# Patient Record
Sex: MALE | Race: WHITE | NOT HISPANIC OR LATINO | Employment: FULL TIME | URBAN - METROPOLITAN AREA
[De-identification: names, ages, dates, MRNs, and addresses within clinical notes are randomized per-mention and may not be internally consistent; named-entity substitution may affect disease eponyms.]

---

## 2022-06-03 ENCOUNTER — TRANSFERRED RECORDS (OUTPATIENT)
Dept: HEALTH INFORMATION MANAGEMENT | Facility: CLINIC | Age: 62
End: 2022-06-03

## 2022-06-14 ENCOUNTER — MEDICAL CORRESPONDENCE (OUTPATIENT)
Dept: HEALTH INFORMATION MANAGEMENT | Facility: CLINIC | Age: 62
End: 2022-06-14

## 2022-06-16 ENCOUNTER — TRANSFERRED RECORDS (OUTPATIENT)
Dept: HEALTH INFORMATION MANAGEMENT | Facility: CLINIC | Age: 62
End: 2022-06-16

## 2022-06-20 ENCOUNTER — MEDICAL CORRESPONDENCE (OUTPATIENT)
Dept: HEALTH INFORMATION MANAGEMENT | Facility: CLINIC | Age: 62
End: 2022-06-20

## 2022-06-30 ENCOUNTER — TRANSCRIBE ORDERS (OUTPATIENT)
Dept: OTHER | Age: 62
End: 2022-06-30

## 2022-06-30 DIAGNOSIS — S31.109A WOUND OF GROIN: Primary | ICD-10-CM

## 2022-07-01 ENCOUNTER — TELEPHONE (OUTPATIENT)
Dept: WOUND CARE | Facility: CLINIC | Age: 62
End: 2022-07-01

## 2022-07-01 DIAGNOSIS — I73.9 PAD (PERIPHERAL ARTERY DISEASE) (H): Primary | ICD-10-CM

## 2022-07-01 NOTE — TELEPHONE ENCOUNTER
Consult received via phone from Jenaro Pedroza for wound of the groin.    Patient has history of Diabetes. Per Standing Order patient qualifies for DEEDEE to be scheduled prior to assessment with Kristina Irwin PA-C or Dr. Nicole at [facility] [place] at [facility] for next available appointment.     Please inquire patient a KAEL lift?  Yes or No    Routing to [facility] Appointment Scheduling Pool.  Orders pending.

## 2022-07-05 ENCOUNTER — TELEPHONE (OUTPATIENT)
Dept: WOUND CARE | Facility: CLINIC | Age: 62
End: 2022-07-05

## 2022-07-05 NOTE — TELEPHONE ENCOUNTER
Future Appointments   Date Time Provider Department Center   8/1/2022 10:30 AM [facility]   8/3/2022  8:50 AM Eder Nicole MD [facility]

## 2022-07-05 NOTE — TELEPHONE ENCOUNTER
This is a WC issue, [facility] needs US order to be faxed to  to be approved.  [place]

## 2022-08-01 ENCOUNTER — HOSPITAL ENCOUNTER (OUTPATIENT)
Dept: ULTRASOUND IMAGING | Facility: CLINIC | Age: 62
Discharge: HOME | End: 2022-08-01
Attending: SURGERY | Admitting: SURGERY
Payer: OTHER MISCELLANEOUS

## 2022-08-01 DIAGNOSIS — I73.9 PAD (PERIPHERAL ARTERY DISEASE) (H): ICD-10-CM

## 2022-08-01 PROCEDURE — 93922 UPR/L XTREMITY ART 2 LEVELS: CPT

## 2022-08-03 ENCOUNTER — HOSPITAL ENCOUNTER (OUTPATIENT)
Dept: WOUND CARE | Facility: CLINIC | Age: 62
Discharge: HOME | End: 2022-08-03
Attending: SURGERY | Admitting: SURGERY
Payer: OTHER MISCELLANEOUS

## 2022-08-03 VITALS
HEIGHT: 72 IN | BODY MASS INDEX: 38.28 KG/M2 | HEART RATE: 74 BPM | TEMPERATURE: 98.2 F | SYSTOLIC BLOOD PRESSURE: 138 MMHG | DIASTOLIC BLOOD PRESSURE: 74 MMHG | WEIGHT: 282.6 LBS

## 2022-08-03 DIAGNOSIS — S31.104A NON-HEALING OPEN WOUND OF LEFT GROIN, INITIAL ENCOUNTER: ICD-10-CM

## 2022-08-03 PROBLEM — N20.0 LEFT RENAL STONE: Status: ACTIVE | Noted: 2022-05-24

## 2022-08-03 PROBLEM — J30.9 ALLERGIC RHINITIS: Status: ACTIVE | Noted: 2017-07-24

## 2022-08-03 PROBLEM — Z99.89 CPAP (CONTINUOUS POSITIVE AIRWAY PRESSURE) DEPENDENCE: Status: ACTIVE | Noted: 2017-07-24

## 2022-08-03 PROBLEM — R68.89 DECREASED ACTIVITY TOLERANCE: Status: ACTIVE | Noted: 2022-01-07

## 2022-08-03 PROBLEM — Z90.89 HX OF TONSILLECTOMY: Status: ACTIVE | Noted: 2017-07-24

## 2022-08-03 PROBLEM — M54.41 ACUTE BILATERAL LOW BACK PAIN WITH RIGHT-SIDED SCIATICA: Status: ACTIVE | Noted: 2022-01-07

## 2022-08-03 PROBLEM — G47.33 OSA (OBSTRUCTIVE SLEEP APNEA): Status: ACTIVE | Noted: 2017-07-24

## 2022-08-03 PROBLEM — M62.81 WEAKNESS OF TRUNK MUSCULATURE: Status: ACTIVE | Noted: 2022-01-07

## 2022-08-03 PROCEDURE — 99203 OFFICE O/P NEW LOW 30 MIN: CPT | Performed by: SURGERY

## 2022-08-03 PROCEDURE — G0463 HOSPITAL OUTPT CLINIC VISIT: HCPCS | Mod: 25

## 2022-08-03 PROCEDURE — 97602 WOUND(S) CARE NON-SELECTIVE: CPT

## 2022-08-03 RX ORDER — CYCLOBENZAPRINE HCL 10 MG
TABLET ORAL
Status: ON HOLD | COMMUNITY
Start: 2021-12-22 | End: 2022-11-11

## 2022-08-03 RX ORDER — METFORMIN HYDROCHLORIDE 850 MG/1
500 TABLET, FILM COATED ORAL
Status: ON HOLD | COMMUNITY
End: 2022-11-11

## 2022-08-03 RX ORDER — DULOXETIN HYDROCHLORIDE 30 MG/1
CAPSULE, DELAYED RELEASE ORAL
Status: ON HOLD | COMMUNITY
Start: 2022-06-27 | End: 2022-11-11

## 2022-08-03 RX ORDER — OMEGA-3 FATTY ACIDS/FISH OIL 300-1000MG
400 CAPSULE ORAL PRN
Status: ON HOLD | COMMUNITY
End: 2022-11-18

## 2022-08-03 RX ORDER — FEXOFENADINE HCL 60 MG/1
60 TABLET, FILM COATED ORAL EVERY MORNING
Status: ON HOLD | COMMUNITY
End: 2022-11-12

## 2022-08-03 RX ORDER — ASPIRIN 81 MG/1
81 TABLET, CHEWABLE ORAL EVERY MORNING
COMMUNITY

## 2022-08-03 RX ORDER — FEXOFENADINE HCL 180 MG/1
180 TABLET ORAL
Status: ON HOLD | COMMUNITY
End: 2022-11-11

## 2022-08-03 RX ORDER — ATORVASTATIN CALCIUM 20 MG/1
20 TABLET, FILM COATED ORAL DAILY
COMMUNITY

## 2022-08-03 RX ORDER — IBUPROFEN 200 MG
400 TABLET ORAL
Status: ON HOLD | COMMUNITY
End: 2022-11-11

## 2022-08-03 RX ORDER — NYSTATIN 100000 [USP'U]/G
POWDER TOPICAL 2 TIMES DAILY PRN
Status: ON HOLD | COMMUNITY
Start: 2022-07-11 | End: 2022-11-12

## 2022-08-03 RX ORDER — LOSARTAN POTASSIUM 25 MG/1
25 TABLET ORAL 2 TIMES DAILY
COMMUNITY

## 2022-08-03 RX ORDER — FLUTICASONE PROPIONATE 50 MCG
2 SPRAY, SUSPENSION (ML) NASAL PRN
COMMUNITY

## 2022-08-03 RX ORDER — METFORMIN HCL 500 MG
1000 TABLET, EXTENDED RELEASE 24 HR ORAL 2 TIMES DAILY WITH MEALS
COMMUNITY
Start: 2022-05-27

## 2022-08-03 NOTE — ADDENDUM NOTE
Encounter addended by: Linda Garcia, RN on: 8/3/2022 2:28 PM   Actions taken: Order list changed, Diagnosis association updated

## 2022-08-03 NOTE — PROGRESS NOTES
Workers Compensation informaton:    [employer]  Reyna Trujillo  Mobile:  [phone]  Office:  [phone]  Fax:  [phone]

## 2022-08-03 NOTE — DISCHARGE INSTRUCTIONS
Fish Abad      1960    Stop smoking- ask PCP for resources.    Keep blood sugars below 150 and A1C below 7    Use boxer shorts (not briefs)    Medications/supplements to aid in healing:  Vitamin D3 5,000 iu per day  Chelsea C 1,000 mg take daily  Vitamin B Complex with folic acid take 1 tablet daily   Vitamin B 12 1000 mcg daily  Vein Formula 1000mg. Take 500mg capsule twice daily. Order from www.Outline or call [phone]. (The 1000mg is two (2) 500mg capsules)     Wound care recommendations to left groin:  - Apply Betadine (povidone iodine) to gauze, lay into wound bed, let sit for 10 minutes  - Apply small amount of VASHE on gauze, lay into wound bed, let sit for 30 minutes, remove gauze (do not rinse) then apply dressing:   - Apply Plurogel to Endoform Antimicrobial, cut-to-fit size of wound, then apply to wound (will need 3 at first)  - Cover with KerraMax or ABD pad  -  Secure with mesh underwear  Change daily     To Order more plurogel: shop.[employer] or call [phone] to place an order for 0.7 oz tube  Use PLUROHEALS (all caps) at checkout in the discount code section to decrease price to $55      M. Jax Nicole M.D. August 3, 2022      Call us at [phone] if you have any questions about your wounds, have redness or swelling around your wound, have a fever of 101 or greater or if you have any other problems or concerns. We answer the phone Monday through Friday 8 am to 4 pm, please leave a message as we check the voicemail frequently throughout the day.     If you had a positive experience please indicate that on your patient satisfaction survey form that [facility] will be sending you.    It was a pleasure meeting with you today.  Thank you for allowing me and my team the privilege of caring for you today.  YOU are the reason we are here, and I truly hope we provided you with the excellent service you deserve.  Please let us know if there is anything else we can do  for you so that we can be sure you are leaving completely satisfied with your care experience.      If you have any billing related questions please call the [facility] Business office at [phone]. The clinic staff does not handle billing related matters.    If you are scheduled have a follow up appointment, you will receive a reminder call the day before your visit. If you are unable to keep that appointment, please call the clinic to cancel or reschedule.

## 2022-08-03 NOTE — PROGRESS NOTES
[facility] [place] Progress Note    Subject: Fish VALLES Tinainocencio consultation for left groin wound, adult onset diabetes, oral medication management, tobacco utilization in the form of cigars, tobacco cessation has been advised, counseled, discussed, patient concurs to cease.  Wound is chronic, approximately 8 years, he believes it started after initiation of an oral antihyperglycemic.  He works short haul , there is also a QR C here today for work-related evaluation.  He is currently working a desk position onsite.  He has pending back surgery when the left groin wound heals.  No insulin use.  He has had some recent urological procedures, cystoscopy, left ureteroscopy with lithotripsy and stent placement.  With regards to left groin wound, causes discomfort, given size, has drainage, he denies fevers chills sweats.    PMH: No past medical history on file.  There is no problem list on file for this patient.    Social Hx:   Social History     Socioeconomic History     Marital status:      Spouse name: Not on file     Number of children: Not on file     Years of education: Not on file     Highest education level: Not on file   Occupational History     Not on file   Tobacco Use     Smoking status: Not on file     Smokeless tobacco: Not on file   Substance and Sexual Activity     Alcohol use: Not on file     Drug use: Not on file     Sexual activity: Not on file   Other Topics Concern     Not on file   Social History Narrative     Not on file     Social Determinants of Health     Financial Resource Strain: Not on file   Food Insecurity: Not on file   Transportation Needs: Not on file   Physical Activity: Not on file   Stress: Not on file   Social Connections: Not on file   Intimate Partner Violence: Not on file   Housing Stability: Not on file       Surgical Hx:   Past Surgical History:   Procedure Laterality Date     VASECTOMY         Allergies:    Allergies   Allergen Reactions      Canagliflozin Unknown       Medications:   No current outpatient medications on file.     No current facility-administered medications for this encounter.       Labs: No results for input(s): ALBUMIN, HGB, INR, WBC, A1C, CRP in the last 05510 hours.    Invalid input(s): PREALBUMIN,  GLUCOSE, MICROBIO  No results found for: CR  No results found for: GFRESTIMATED  No results found for: GFRESTBLACK  No results found for: WBC  No results found for: RBC  No results found for: HGB  No results found for: HCT  No components found for: MCT  No results found for: MCV  No results found for: MCH  No results found for: MCHC  No results found for: RDW  No results found for: PLT       Nutrition requirements were discussed with patient today.  Objective:  /74 (BP Location: Left arm)   Pulse 74   Temp 98.2  F (36.8  C)   Ht 1.829 m (6')   Wt 128.2 kg (282 lb 9.6 oz)   BMI 38.33 kg/m    Wound (used by OP [facility] only) 08/03/22 Left groin (Active)   Length (cm) 11 08/03/22 0800   Width (cm) 15 08/03/22 0800   Depth (cm) 0.2 08/03/22 0800   Wound (cm^2) 165 cm^2 08/03/22 0800   Wound Volume (cm^3) 33 cm^3 08/03/22 0800   Drainage Characteristics/Odor serosanguineous 08/03/22 0800   Drainage Amount moderate 08/03/22 0800        General:  Patient is alert and orientated, no acute distress.  Conversant.  Left groin wound extending to lateral aspect of scrotum, overlapping pannus, no periwound cellulitis, no odor, no tendon exposure, superficial.  No evidence of hidradenitis.  No lesions elsewhere on pannus or right groin.          Impression: Chronic left groin wound, diabetes, tobacco utilization      Plan: Bacot cessation, counseled, discussed, patient concurs.  Discussed with JAYDE CHAMPION.  We will dress the wounds with left groin Betadine soak, 15 minutes, then application of Vashe hypochlorous acid for 15 minutes, then application of PluroGel on antimicrobial Endoform, ABD pad, meshed underwear, then use boxer shorts, shower on daily  basis, anticipate wound will be 40% improved in the next 4 to 6 weeks..  Patient will return to the clinic in 4 weeks time.       Further instructions from your care team       Fish Abad      1960    Stop smoking- ask PCP for resources.    Keep blood sugars below 150 and A1C below 7    Use boxer shorts (not briefs)    Medications/supplements to aid in healin. Vitamin D3 5,000 iu per day  2. Chelsea C 1,000 mg take daily  3. Vitamin B Complex with folic acid take 1 tablet daily   4. Vitamin B 12 1000 mcg daily  5. Vein Formula 1000mg. Take 500mg capsule twice daily. Order from www.[employer] or call [phone]. (The 1000mg is two (2) 500mg capsules)     Wound care recommendations to left groin:  - Apply Betadine (povidone iodine) to gauze, lay into wound bed, let sit for 10 minutes  - Apply small amount of VASHE on gauze, lay into wound bed, let sit for 30 minutes, remove gauze (do not rinse) then apply dressing:   - Apply Plurogel to Endoform Antimicrobial, cut-to-fit size of wound, then apply to wound (will need 3 at first)  - Cover with KerraMax or ABD pad  -  Secure with mesh underwear  Change daily     To Order more plurogel: shop.[employer] or call [phone] to place an order for 0.7 oz tube  Use PLUROHEALS (all caps) at checkout in the discount code section to decrease price to $55      M. Jax Nicole M.D. August 3, 2022      Call us at [phone] if you have any questions about your wounds, have redness or swelling around your wound, have a fever of 101 or greater or if you have any other problems or concerns. We answer the phone Monday through Friday 8 am to 4 pm, please leave a message as we check the voicemail frequently throughout the day.     If you had a positive experience please indicate that on your patient satisfaction survey form that [facility] will be sending you.    It was a pleasure meeting with you today.  Thank you for allowing me and my team the  privilege of caring for you today.  YOU are the reason we are here, and I truly hope we provided you with the excellent service you deserve.  Please let us know if there is anything else we can do for you so that we can be sure you are leaving completely satisfied with your care experience.      If you have any billing related questions please call the [facility] Business office at [phone]. The clinic staff does not handle billing related matters.    If you are scheduled have a follow up appointment, you will receive a reminder call the day before your visit. If you are unable to keep that appointment, please call the clinic to cancel or reschedule.             Eder Nicole MD on 8/3/2022 at 8:49 AM        Dictated using Dragon voice recognition software which may result in transcription errors

## 2022-08-03 NOTE — PROGRESS NOTES
Patient arrived for wound care visit. Certified Wound Care Nurse time spent evaluating patient record, completed a full evaluation and documented wound(s) & talon-wound skin; provided recommendation based on treatment plan. Applied dressing, reviewed discharge instructions, patient education, and discussed plan of care with appropriate medical team staff members and patient and/or family members.

## 2022-08-17 ENCOUNTER — TELEPHONE (OUTPATIENT)
Dept: WOUND CARE | Facility: CLINIC | Age: 62
End: 2022-08-17

## 2022-08-17 NOTE — TELEPHONE ENCOUNTER
Returned call to patient. He has several concerns including bleeding of the wound, pain, and a new pain in his penis. Scheduled the patient for 8/18 with Dr. Nicole. No further questions or concerns.

## 2022-08-17 NOTE — TELEPHONE ENCOUNTER
Patient left a voicemail stating that he was having some problems and is requesting to speak to someone. No other details were left.    [phone]

## 2022-08-18 ENCOUNTER — HOSPITAL ENCOUNTER (OUTPATIENT)
Dept: WOUND CARE | Facility: CLINIC | Age: 62
Discharge: HOME | End: 2022-08-18
Attending: SURGERY | Admitting: SURGERY
Payer: COMMERCIAL

## 2022-08-18 VITALS — DIASTOLIC BLOOD PRESSURE: 64 MMHG | HEART RATE: 100 BPM | SYSTOLIC BLOOD PRESSURE: 130 MMHG | TEMPERATURE: 97.6 F

## 2022-08-18 DIAGNOSIS — S31.104D NON-HEALING OPEN WOUND OF LEFT GROIN, SUBSEQUENT ENCOUNTER: ICD-10-CM

## 2022-08-18 PROCEDURE — 97602 WOUND(S) CARE NON-SELECTIVE: CPT

## 2022-08-18 PROCEDURE — 99213 OFFICE O/P EST LOW 20 MIN: CPT | Performed by: SURGERY

## 2022-08-18 NOTE — PROGRESS NOTES
[facility] [place] Progress Note    Subject: Fish Abad chronic left groin wound, insurance would not pay for Endoform, was changed to Galina: Galina is causing significant pain and discomfort.    Patient Active Problem List   Diagnosis     Acute bilateral low back pain with right-sided sciatica     Allergic rhinitis     CPAP (continuous positive airway pressure) dependence     Decreased activity tolerance     Hx of tonsillectomy     Left renal stone     FRANKY (obstructive sleep apnea)     Weakness of trunk musculature     Non-healing left groin open wound     No past medical history on file.  Exam:  /64 (BP Location: Left arm)   Pulse 100   Temp 97.6  F (36.4  C)   Wound (used by OP [facility] only) 08/03/22 Left groin (Active)   Thickness/Stage full thickness 08/18/22 1100   Base granulating;slough 08/18/22 1100   Periwound pink 08/18/22 1100   Periwound Temperature warm 08/18/22 1100   Periwound Skin Turgor soft 08/18/22 1100   Edges open 08/18/22 1100   Length (cm) 8 08/18/22 1100   Width (cm) 14 08/18/22 1100   Depth (cm) 0.1 08/18/22 1100   Wound (cm^2) 112 cm^2 08/18/22 1100   Wound Volume (cm^3) 11.2 cm^3 08/18/22 1100   Wound healing % 32.12 08/18/22 1100   Drainage Characteristics/Odor serosanguineous 08/18/22 1100   Drainage Amount moderate 08/18/22 1100   Care, Wound non-select wound debridement performed 08/18/22 1100     Remnant Galina was removed from left medial groin, wound soaked in hypochlorous acid Vashe, no cellulitis, wound overall is 30% improved in the past 3 weeks.        Impression: Chronic left groin wound, pending spine surgery    Plan: Discontinue Galina.  Application of wound hydrogel with collagen, Xeroform, change on daily basis and/or as needed.  He showers on a daily basis.  Would not proceed with spine surgery until this wound is completely healed.  Continue adjunctive bio nutrients Patient will return to the clinic in 4 weeks time  He is performing his own  dressing changes.      Further instructions from your care team       Fish VALLES [employer]      1960    Stop smoking- ask PCP for resources.  Keep blood sugars below 150 and A1C below 7  Use boxer shorts (not briefs)    Medications/supplements to aid in healing:  Vitamin D3 5,000 iu per day  Chelsea C 1,000 mg take daily  Vitamin B Complex with folic acid take 1 tablet daily  Vitamin B 12 1000 mcg daily  Vein Formula 1000mg. Take 500mg capsule twice daily. Order from  www.[employer] or call [phone]. (The 1000mg is two (2) 500mg capsules)    Wound care recommendations to left groin:  - Apply Betadine (povidone iodine) to gauze, lay into wound bed, let sit for 10 minutes  - Apply small amount of VASHE on gauze, lay into wound bed, let sit for 30 minutes, remove gauze (do not rinse) then apply dressing:  - Apply WounDres gel to Xeroform, cut-to-fit size of wound, then apply to wound   - Cover with KerraMax or ABD pad  - Secure with mesh underwear  Change daily      BELLE Nicole M.D. August 18, 2022    How to Quit Smoking  Smoking is one of the hardest habits to break. About half of all people who have ever smoked have been able to quit. Most people who still smoke want to quit. Here are some of the best ways to stop smoking.    Keep trying  Most smokers make many attempts at quitting before they are successful. It s important not to give up.  Go cold turkey  Most former smokers quit cold turkey (all at once). Trying to cut back gradually doesn't seem to work as well, perhaps because it continues the smoking habit. Also, it is possible to inhale more while smoking fewer cigarettes. This results in the same amount of nicotine in your body.  Get support  Support programs can be a big help, especially for heavy smokers. These groups offer lectures, ways to change behavior, and peer support. Here are some ways to find a support program:    Free national quitline: 800-QUIT-NOW (399-606-2071).    [facility]  "quit-smoking programs.    American Lung Association: (713.100.8112).    American Cancer Society (661-834-3242).  Support at home is important too. Nonsmokers can offer praise and encouragement. If the smoker in your life finds it hard to quit, encourage them to keep trying.  Over-the-counter medicines  Nicotine replacement therapy may make quitting easier. Certain aids, such as the nicotine patch, gum, and lozenges, are available without a prescription. It is best to use these under a doctor s care, though. The skin patch provides a steady supply of nicotine. Nicotine gum and lozenges give temporary bursts of low levels of nicotine. Both methods reduce the craving for cigarettes. Warning: If you have nausea, vomiting, dizziness, weakness, or a fast heartbeat, stop using these products and see your doctor.  Prescription medicines  After reviewing your smoking patterns and past attempts to quit, your doctor may offer a prescription medicine such as bupropion, varenicline, a nicotine inhaler, or nasal spray. Each has advantages and side effects. Your doctor can review these with you.  Health benefits of quitting  The benefits of quitting start right away and keep improving the longer you go without smoking. These benefits occur at any age.  So whether you are 17 or 70, quitting is a good decision. Some of the benefits include:    20 minutes: Blood pressure and pulse return to normal. Improve wound healing    8 hours: Oxygen levels return to normal.    2 days: Ability to smell and taste begin to improve as damaged nerves regrow.    2 to 3 weeks: Circulation and lung function improve.    1 to 9 months: Coughing, congestion, and shortness of breath decrease; tiredness decreases.    1 year: Risk of heart attack decreases by half.    5 years: Risk of lung cancer decreases by half; risk of stroke becomes the same as a nonsmoker s.  For more on how to quit smoking, try these online resources:     Smokefree.gov    \"Clearing " "the Air\" booklet from the National Cancer [place]: smokefree.gov/sites/default/files/pdf/clearing-the-air-accessible.pdf  Date Last Reviewed: 3/1/2017    1391-6864 The [employer]. [address] [place] [address]. All rights reserved. This information is not intended as a substitute for professional medical care. Always follow your healthcare professional's instructions.             Call us at [phone] if you have any questions about your wounds, have redness or swelling around your wound, have a fever of 101 or greater or if you have any other problems or concerns. We answer the phone Monday through Friday 8 am to 4 pm, please leave a message as we check the voicemail frequently throughout the day.     If you had a positive experience please indicate that on your patient satisfaction survey form that [facility] will be sending you.    It was a pleasure meeting with you today.  Thank you for allowing me and my team the privilege of caring for you today.  YOU are the reason we are here, and I truly hope we provided you with the excellent service you deserve.  Please let us know if there is anything else we can do for you so that we can be sure you are leaving completely satisfied with your care experience.      If you have any billing related questions please call the [facility] Business office at [phone]. The clinic staff does not handle billing related matters.    If you are scheduled have a follow up appointment, you will receive a reminder call the day before your visit. If you are unable to keep that appointment, please call the clinic to cancel or reschedule.           Eder Nicole MD on 8/18/2022 at 12:01 PM    Dictated using Dragon voice recognition software which may result in transcription errors  "

## 2022-08-18 NOTE — DISCHARGE INSTRUCTIONS
Fish Abad      1960    Stop smoking- ask PCP for resources.  Keep blood sugars below 150 and A1C below 7  Use boxer shorts (not briefs)    Medications/supplements to aid in healing:  Vitamin D3 5,000 iu per day  Chelsea C 1,000 mg take daily  Vitamin B Complex with folic acid take 1 tablet daily  Vitamin B 12 1000 mcg daily  Vein Formula 1000mg. Take 500mg capsule twice daily. Order from  www.[employer] or call [phone]. (The 1000mg is two (2) 500mg capsules)    Wound care recommendations to left groin:  - Apply Betadine (povidone iodine) to gauze, lay into wound bed, let sit for 10 minutes  - Apply small amount of VASHE on gauze, lay into wound bed, let sit for 30 minutes, remove gauze (do not rinse) then apply dressing:  - Apply WounDres gel to Xeroform, cut-to-fit size of wound, then apply to wound   - Cover with KerraMax or ABD pad  - Secure with mesh underwear  Change daily      BELLE Nicole M.D. August 18, 2022    How to Quit Smoking  Smoking is one of the hardest habits to break. About half of all people who have ever smoked have been able to quit. Most people who still smoke want to quit. Here are some of the best ways to stop smoking.    Keep trying  Most smokers make many attempts at quitting before they are successful. It s important not to give up.  Go cold turkey  Most former smokers quit cold turkey (all at once). Trying to cut back gradually doesn't seem to work as well, perhaps because it continues the smoking habit. Also, it is possible to inhale more while smoking fewer cigarettes. This results in the same amount of nicotine in your body.  Get support  Support programs can be a big help, especially for heavy smokers. These groups offer lectures, ways to change behavior, and peer support. Here are some ways to find a support program:  Free national quitline: 800-QUIT-NOW (827-792-2867).  [facility] quit-smoking programs.  American Lung Association: (380.310.8574).  American  "Cancer Society (736-827-0169).  Support at home is important too. Nonsmokers can offer praise and encouragement. If the smoker in your life finds it hard to quit, encourage them to keep trying.  Over-the-counter medicines  Nicotine replacement therapy may make quitting easier. Certain aids, such as the nicotine patch, gum, and lozenges, are available without a prescription. It is best to use these under a doctor s care, though. The skin patch provides a steady supply of nicotine. Nicotine gum and lozenges give temporary bursts of low levels of nicotine. Both methods reduce the craving for cigarettes. Warning: If you have nausea, vomiting, dizziness, weakness, or a fast heartbeat, stop using these products and see your doctor.  Prescription medicines  After reviewing your smoking patterns and past attempts to quit, your doctor may offer a prescription medicine such as bupropion, varenicline, a nicotine inhaler, or nasal spray. Each has advantages and side effects. Your doctor can review these with you.  Health benefits of quitting  The benefits of quitting start right away and keep improving the longer you go without smoking. These benefits occur at any age.  So whether you are 17 or 70, quitting is a good decision. Some of the benefits include:  20 minutes: Blood pressure and pulse return to normal. Improve wound healing  8 hours: Oxygen levels return to normal.  2 days: Ability to smell and taste begin to improve as damaged nerves regrow.  2 to 3 weeks: Circulation and lung function improve.  1 to 9 months: Coughing, congestion, and shortness of breath decrease; tiredness decreases.  1 year: Risk of heart attack decreases by half.  5 years: Risk of lung cancer decreases by half; risk of stroke becomes the same as a nonsmoker s.  For more on how to quit smoking, try these online resources:   Smokefree.gov  \"Clearing the Air\" booklet from the National Cancer Clinton: " smokefree.gov/sites/default/files/pdf/clearing-the-air-accessible.pdf  Date Last Reviewed: 3/1/2017    6030-9015 [employer]. [address] [place] [address], [place] [address]. All rights reserved. This information is not intended as a substitute for professional medical care. Always follow your healthcare professional's instructions.             Call us at [phone] if you have any questions about your wounds, have redness or swelling around your wound, have a fever of 101 or greater or if you have any other problems or concerns. We answer the phone Monday through Friday 8 am to 4 pm, please leave a message as we check the voicemail frequently throughout the day.     If you had a positive experience please indicate that on your patient satisfaction survey form that [facility] will be sending you.    It was a pleasure meeting with you today.  Thank you for allowing me and my team the privilege of caring for you today.  YOU are the reason we are here, and I truly hope we provided you with the excellent service you deserve.  Please let us know if there is anything else we can do for you so that we can be sure you are leaving completely satisfied with your care experience.      If you have any billing related questions please call the [facility] Business office at [phone]. The clinic staff does not handle billing related matters.    If you are scheduled have a follow up appointment, you will receive a reminder call the day before your visit. If you are unable to keep that appointment, please call the clinic to cancel or reschedule.

## 2022-08-31 ENCOUNTER — TRANSFERRED RECORDS (OUTPATIENT)
Dept: HEALTH INFORMATION MANAGEMENT | Facility: CLINIC | Age: 62
End: 2022-08-31

## 2022-08-31 ENCOUNTER — TELEPHONE (OUTPATIENT)
Dept: WOUND CARE | Facility: CLINIC | Age: 62
End: 2022-08-31

## 2022-08-31 ENCOUNTER — HOSPITAL ENCOUNTER (OUTPATIENT)
Dept: WOUND CARE | Facility: CLINIC | Age: 62
Discharge: HOME | End: 2022-08-31
Attending: SURGERY | Admitting: SURGERY
Payer: COMMERCIAL

## 2022-08-31 VITALS — SYSTOLIC BLOOD PRESSURE: 146 MMHG | TEMPERATURE: 98.2 F | HEART RATE: 85 BPM | DIASTOLIC BLOOD PRESSURE: 81 MMHG

## 2022-08-31 DIAGNOSIS — S31.104D NON-HEALING OPEN WOUND OF LEFT GROIN, SUBSEQUENT ENCOUNTER: Primary | ICD-10-CM

## 2022-08-31 PROCEDURE — 99213 OFFICE O/P EST LOW 20 MIN: CPT | Mod: 25 | Performed by: SURGERY

## 2022-08-31 PROCEDURE — 11106 INCAL BX SKN SINGLE LES: CPT | Performed by: SURGERY

## 2022-08-31 PROCEDURE — 17250 CHEM CAUT OF GRANLTJ TISSUE: CPT | Performed by: SURGERY

## 2022-08-31 PROCEDURE — 88305 TISSUE EXAM BY PATHOLOGIST: CPT | Mod: TC | Performed by: SURGERY

## 2022-08-31 PROCEDURE — 88305 TISSUE EXAM BY PATHOLOGIST: CPT | Mod: 26 | Performed by: DERMATOLOGY

## 2022-08-31 PROCEDURE — 97602 WOUND(S) CARE NON-SELECTIVE: CPT

## 2022-08-31 RX ORDER — TRAZODONE HYDROCHLORIDE 50 MG/1
TABLET, FILM COATED ORAL
Status: ON HOLD | COMMUNITY
Start: 2022-08-15 | End: 2022-11-11

## 2022-08-31 RX ORDER — ATORVASTATIN CALCIUM 10 MG/1
TABLET, FILM COATED ORAL
Status: ON HOLD | COMMUNITY
Start: 2022-08-08 | End: 2022-11-11

## 2022-08-31 NOTE — TELEPHONE ENCOUNTER
[facility] is unable to take the referral for this patient due to being unable to accept Work Comp Claims

## 2022-08-31 NOTE — ADDENDUM NOTE
Encounter addended by: Charmaine Ruiz RN on: 8/31/2022 1:47 PM   Actions taken: Order list changed, Diagnosis association updated

## 2022-08-31 NOTE — DISCHARGE INSTRUCTIONS
Fish Abad      1960    Congratulations on stopping smoking!!  :)   Keep blood sugars below 150 and A1C below 7  Use boxer shorts (not briefs). Keep area open to air when possible/when at home.    Must off work 6 weeks; home care referral placed    Medications/supplements to aid in healing:  Vitamin D3 5,000 iu per day  Chelsea C 1,000 mg take daily  Vitamin B Complex with folic acid take 1 tablet daily  Vitamin B 12 1000 mcg daily  Vein Formula 1000mg. Take 500mg capsule twice daily. Order from www.[employer] or call [phone]. (The 1000mg is two (2) 500mg capsules)    Wound care recommendations to left groin:  - Apply VASHE on gauze, lay into wound bed, let sit for 30 minutes, remove gauze (do not rinse)   - Apply Drysol to wound margins  - Apply WounDres gel to Endoform Antimicrobial, cut-to-fit size of wound, then apply to wound  - Cover with KerraMax or ABD pad  - Secure with mesh underwear  Change daily     BELLE Nicole M.D. August 31, 2022    Call us at [phone] if you have any questions about your wounds, have redness or swelling around your wound, have a fever of 101 or greater or if you have any other problems or concerns. We answer the phone Monday through Friday 8 am to 4 pm, please leave a message as we check the voicemail frequently throughout the day.     If you had a positive experience please indicate that on your patient satisfaction survey form that [facility] will be sending you.    It was a pleasure meeting with you today.  Thank you for allowing me and my team the privilege of caring for you today.  YOU are the reason we are here, and I truly hope we provided you with the excellent service you deserve.  Please let us know if there is anything else we can do for you so that we can be sure you are leaving completely satisfied with your care experience.      If you have any billing related questions please call the [facility] Business office at [phone]. The  clinic staff does not handle billing related matters.    If you are scheduled to have a follow up appointment, you will receive a reminder call the day before your visit. On the appointment day please arrive 15 minutes prior to your appointment time. If you are unable to keep that appointment, please call the clinic to cancel or reschedule. If you are more than 10 minutes late or greater for your appointment, the clinic policy is that you may be asked to reschedule.

## 2022-08-31 NOTE — PROGRESS NOTES
[facility] Progress Note    Subject: Fish Abad chronic left groin wound which has been intermittently closed for short periods of time though largely open over the course of the last 8 years, this is related to Workmen's Comp., his Workmen's Comp. coordinator is present for today's evaluation.  We had previously ordered Endoform though this was denied by the insurance carrier, we will request Endoform be utilized in the left groin wound in addition to collagen hydrogel after 30-minute Vashe soak, stop Betadine.  We will also take him off work for approximately 6 weeks due to the significance of sweating in this groin region while at work, this is markedly impairing wound healing, he has had no substantial improvement since last evaluation, we will add in Drysol to the periwound margins, a MicroGenDx swab was obtained today from the left groin to determine if there is any significant fungal element, or resistant bacteria, this is a DNA sequencing to identify bacteria and fungus within the wound and can help determine appropriate antibiotic or antifungal if clinically indicated for treatment. Patient states there was fungus before, also a full-thickness biopsy was performed today given nonsignificance of progression.    Patient Active Problem List   Diagnosis     Acute bilateral low back pain with right-sided sciatica     Allergic rhinitis     CPAP (continuous positive airway pressure) dependence     Decreased activity tolerance     Hx of tonsillectomy     Left renal stone     FRANKY (obstructive sleep apnea)     Weakness of trunk musculature     Non-healing left groin open wound     No past medical history on file.  Exam:  BP (!) 146/81 (BP Location: Left arm, Patient Position: Sitting)   Pulse 85   Temp 98.2  F (36.8  C) (Temporal)   Wound (used by OP [facility] only) 08/03/22 Left groin (Active)   Thickness/Stage full thickness 08/31/22 0946   Base granulating 08/31/22 0946   Periwound pink  08/31/22 0946   Periwound Temperature warm 08/31/22 0946   Periwound Skin Turgor soft 08/31/22 0946   Edges open 08/31/22 0946   Length (cm) 12 08/31/22 0946   Width (cm) 9.3 08/31/22 0946   Depth (cm) 0.2 08/31/22 0946   Wound (cm^2) 111.6 cm^2 08/31/22 0946   Wound Volume (cm^3) 22.32 cm^3 08/31/22 0946   Wound healing % 32.36 08/31/22 0946   Drainage Characteristics/Odor serosanguineous 08/31/22 0946   Drainage Amount moderate 08/31/22 0946   Care, Wound non-select wound debridement performed 08/31/22 0946     Region of the skin painted with Betadine, 1% lidocaine with epinephrine infiltrated in the subdermal position, full-thickness incisional biopsy obtained, approximately 15 x 15 mm, sent to [facility] dermatopathology for evaluation, silver nitrate applied, wound cleansed with Vashe.  Patient tolerated procedure well without complications.  A micro GEN DX swab was performed from the left groin today.        Impression: 8-year history of chronic intermittent left groin wound, history of low back injury pending spine surgery when groin wound healed, micro GEN DX DNA microbial identification testing performed    Plan: Medical recommendation and clinical recommendation of being off work over the course of the next 6 weeks, daily dressing changes, he will largely keep his groin, abdomen, left leg exposed to prevent over sweating, utilize Drysol and periwound margin, antimicrobial Endoform withcollagen hydrogel, change on daily basis after soaking with hypochlorous acid Vashe for 30 minutes, stop Betadine, home care visits 2-3 times a week, he resides in [facility].  Patient will return to the clinic in 4-6 weeks time  Will await micro GEN DX DNA microbial identification testing results to determine if additional antibiotics or antifungal is indicated.  He has ceased tobacco utilization over the past 6 weeks.    Addendum on September 6, 2022, notified patient of dermatopathology specimen which  demonstrates basal cell malignancy, nodular, infiltrating, needs further assessment for management, recommended dermatology specialist, he will contact and we are sending referral, I have also called dermatology specialist to speak with the clinician.      Further instructions from your care team       Fish Abad      1960    Congratulations on stopping smoking!!  :)   Keep blood sugars below 150 and A1C below 7  Use boxer shorts (not briefs). Keep area open to air when possible/when at home.    Must off work 6 weeks; home care referral placed    Medications/supplements to aid in healing:  Vitamin D3 5,000 iu per day  Chelsea C 1,000 mg take daily  Vitamin B Complex with folic acid take 1 tablet daily  Vitamin B 12 1000 mcg daily  Vein Formula 1000mg. Take 500mg capsule twice daily. Order from www.[employer] or call [phone]. (The 1000mg is two (2) 500mg capsules)    Wound care recommendations to left groin:  - Apply VASHE on gauze, lay into wound bed, let sit for 30 minutes, remove gauze (do not rinse)   - Apply Drysol to wound margins  - Apply WounDres gel to Endoform Antimicrobial, cut-to-fit size of wound, then apply to wound  - Cover with KerraMax or ABD pad  - Secure with mesh underwear  Change daily     BELLE Nicole M.D. August 31, 2022    Call us at [phone] if you have any questions about your wounds, have redness or swelling around your wound, have a fever of 101 or greater or if you have any other problems or concerns. We answer the phone Monday through Friday 8 am to 4 pm, please leave a message as we check the voicemail frequently throughout the day.     If you had a positive experience please indicate that on your patient satisfaction survey form that [facility] will be sending you.    It was a pleasure meeting with you today.  Thank you for allowing me and my team the privilege of caring for you today.  YOU are the reason we are here, and I truly hope we provided you  with the excellent service you deserve.  Please let us know if there is anything else we can do for you so that we can be sure you are leaving completely satisfied with your care experience.      If you have any billing related questions please call the [facility] Business office at [phone]. The clinic staff does not handle billing related matters.    If you are scheduled to have a follow up appointment, you will receive a reminder call the day before your visit. On the appointment day please arrive 15 minutes prior to your appointment time. If you are unable to keep that appointment, please call the clinic to cancel or reschedule. If you are more than 10 minutes late or greater for your appointment, the clinic policy is that you may be asked to reschedule.           Eder Nicole MD on 8/31/2022 at 9:53 AM    Dictated using Dragon voice recognition software which may result in transcription errors

## 2022-09-01 ENCOUNTER — TRANSFERRED RECORDS (OUTPATIENT)
Dept: HEALTH INFORMATION MANAGEMENT | Facility: CLINIC | Age: 62
End: 2022-09-01

## 2022-09-02 ENCOUNTER — TELEPHONE (OUTPATIENT)
Dept: WOUND CARE | Facility: CLINIC | Age: 62
End: 2022-09-02

## 2022-09-02 LAB
PATH REPORT.COMMENTS IMP SPEC: ABNORMAL
PATH REPORT.COMMENTS IMP SPEC: ABNORMAL
PATH REPORT.COMMENTS IMP SPEC: YES
PATH REPORT.FINAL DX SPEC: ABNORMAL
PATH REPORT.GROSS SPEC: ABNORMAL
PATH REPORT.MICROSCOPIC SPEC OTHER STN: ABNORMAL
PATH REPORT.RELEVANT HX SPEC: ABNORMAL

## 2022-09-02 NOTE — TELEPHONE ENCOUNTER
Patient called to report that after he had his biopsy the other day he had a few fainting spells, vomited a couple times and lost his hearing.  Spoke to a nurses line that evening.  Feeling better now, still having some residual hearing issues, just wanted [facility] to be aware.

## 2022-09-02 NOTE — TELEPHONE ENCOUNTER
Discussed with patient. He stated that after his biopsy on  Wednesday he fell twice, at home, probably related to his back and because the room was spinning as well as throwing up twice. Thursday morning he stated he called his PCP but he was out and so he spoke with the nurses there instead. The nurses stated he may have been in shock. Patient stated the nurse spoke with a provider who said he should be over the worst of it. Patient was advised to go to ED right away if symptoms of fainting, tunnel vision, or vomiting arise again. Seeing PCP on 9/14. Patient also stated that his hearing has gotten better but not perfect and the nurse stated it could take a week or two.

## 2022-09-06 ENCOUNTER — TELEPHONE (OUTPATIENT)
Dept: WOUND CARE | Facility: CLINIC | Age: 62
End: 2022-09-06

## 2022-09-06 DIAGNOSIS — S31.104D NON-HEALING OPEN WOUND OF LEFT GROIN, SUBSEQUENT ENCOUNTER: Primary | ICD-10-CM

## 2022-09-06 NOTE — TELEPHONE ENCOUNTER
"Patient left a voicemail stating that Dr Nicole called him today 9/6 with news of a skin cancer diagnosis. He has some questions about treatment and potential protocol.    He also said he was suppose to receive some \"underpants\" from the medical supply store but did not and is wondering how he can get those.    [phone]  "

## 2022-09-06 NOTE — TELEPHONE ENCOUNTER
Biopsy showed basal cell carcinoma. Per Dr. Nicole, referral sent [facility].  Dr. Nicole discussed with patient.

## 2022-09-06 NOTE — TELEPHONE ENCOUNTER
Reyna Wright asking for the visit notes from the patient's last visit on 8/31 as well as the biopsy results. Please fax to [phone]    Reyna-  Fax: [phone]  Phone: [phone]

## 2022-09-12 ENCOUNTER — TELEPHONE (OUTPATIENT)
Dept: WOUND CARE | Facility: CLINIC | Age: 62
End: 2022-09-12

## 2022-09-12 DIAGNOSIS — S31.104D NON-HEALING OPEN WOUND OF LEFT GROIN, SUBSEQUENT ENCOUNTER: Primary | ICD-10-CM

## 2022-09-12 NOTE — TELEPHONE ENCOUNTER
[facility] Wound    Who is the name of the provider?:  Bonnie      What is the location you see this provider at?: [place]    Reason for call:  Wound worsening since biopsy.  Increased pain and bloody drainage.    Can we leave a detailed message on this number?  YES

## 2022-09-13 ENCOUNTER — TELEPHONE (OUTPATIENT)
Dept: WOUND CARE | Facility: CLINIC | Age: 62
End: 2022-09-13

## 2022-09-13 NOTE — TELEPHONE ENCOUNTER
Call placed to [facility]ol[facility] specialist and spoke with Danny.  Biopsy results faxed to [facility] at [phone].  Results were needed in order to schedule a consult for a Mohs procedure.  Fax was confirmed.  Patient notified of results being obtained/faxed, and dermatology specialist will follow up with consultation appointment.

## 2022-09-13 NOTE — TELEPHONE ENCOUNTER
Biopsy report was sent to Dermat[facility] office via fax. They are requesting the images and office notes from the the date of the biopsy be resent via e-mail or mailed due to the faxed images being too grainy.   Office notes can be faxed, but please email or mail images if possible    [email or web address]    [facility]  Attention: Medical Records  [address]  [place]   [address]    Fax [phone]

## 2022-09-13 NOTE — TELEPHONE ENCOUNTER
Patient called requesting the biopsy report be sent ASAP to the provider that will be removing his cancer. He did not leave the name of the provider or any additional info.    [phone]

## 2022-09-14 NOTE — TELEPHONE ENCOUNTER
Faxed last office note to [facility] as Dr. Nicole referred patient.  Per Dr. Nicole, when he spoke with physician, the physician stated they only needed notes.  Dr. Nicole said provider is welcome to call him to discuss directly.  I did note this on the fax cover sheet and also suggested if they need anything further, they should contact [facility] at [phone].

## 2022-09-14 NOTE — TELEPHONE ENCOUNTER
Morenita from [facility] called again 9/13 stating that clearer images were still needed as well as the office notes from the patient's most recent I appointment (appears to be 8/31/22). Per Umu Roberson, we are unable to assist with images in any other format than what has already been done. She advised that [facility] contact the [facility] HIM[facility] department at [phone] for images. I have left a voicemail with [facility] stating this as well as letting them know that the office notes will be faxed over by [facility].     Please fax office notes from the patient's most recent I visit to [phone]. Please also include a note to contact our HIM[facility] department at [phone] for the requested images.

## 2022-09-15 ENCOUNTER — TELEPHONE (OUTPATIENT)
Dept: WOUND CARE | Facility: CLINIC | Age: 62
End: 2022-09-15

## 2022-09-15 NOTE — TELEPHONE ENCOUNTER
Referral received for patient is more than [facility] can handle, they suggest referring patient to [facility] Hernandez at [facility]

## 2022-09-15 NOTE — TELEPHONE ENCOUNTER
Discussed with Dr. Nicole. Urgent Referral sent to [facility] dermatology.

## 2022-09-19 ENCOUNTER — TELEPHONE (OUTPATIENT)
Dept: DERMATOLOGY | Facility: CLINIC | Age: 62
End: 2022-09-19

## 2022-09-19 NOTE — TELEPHONE ENCOUNTER
Called patient to schedule surgery with Dr. Hernandez    Date of Surgery: 11/2    Surgery type: mohs    Consult scheduled: Yes    Has patient had mohs before? No    Additional comments: patient is very nervous about surgery and said spot is very painful. Will most likely need anti anxiety meds      Aishwarya Pressley on 9/19/2022 at 2:35 PM

## 2022-09-20 NOTE — TELEPHONE ENCOUNTER
FUTURE VISIT INFORMATION      FUTURE VISIT INFORMATION:    Date: 10.12.22    Time: 1:15 PM    Location:  Derm  REFERRAL INFORMATION:    Referring provider:  Bonnie    Referring providers clinic:  [facility]    Reason for visit/diagnosis  BCC left groin    RECORDS REQUESTED FROM:       Clinic name Comments Records Status Imaging Status   [facility] 8.31.22 [id number], Bonnie  8.18.22 Bonnie [facility]

## 2022-09-28 ENCOUNTER — TELEPHONE (OUTPATIENT)
Dept: WOUND CARE | Facility: CLINIC | Age: 62
End: 2022-09-28

## 2022-09-28 NOTE — TELEPHONE ENCOUNTER
Patient is concerned that the wound is looking black and smells funny.  Did complain that he had chills last evening.  [phone]

## 2022-09-28 NOTE — TELEPHONE ENCOUNTER
Discussed with patient. Stated that he had some chills last night and couldn't get warm. Unable to take his temperature so unsure if he has a fever. Unable to see his wound fully but stated that the edges of the wound are always a little red looking, will send photo of wound to clinic phone. Patient continues on VASHE soaks however Dr. Nicole stated the changed the dressing twice daily and so he ran out of endoform. Patient stated that a nurse here said he could use xerofrom in place of the endoform when he ran out. This morning when he changed his dressing he sated that the xerofrom was all black looking and the odor smelled different than usual. Next appointment is on 10/4. Denies nausea and chills at the moment, denies any changes with pain.  Will await photo and call patient back with plan.

## 2022-09-28 NOTE — TELEPHONE ENCOUNTER
Received photos from patient and also discussed. Patient stated that when he was taking the photos he discovered a piece of xeroform that was left on his wound. He stated that when he pulled it off it was black and smelly but showered and soaked the wound with vashe and already feels better. Writer stated that if he feels nauseous or has chills again to be evaluated in urgent care. Also recommended to use a mirror or phone camera to look at wound to ensure all dressing has been removed. Appointment on 10/4 with Dr. Nicole.

## 2022-10-04 ENCOUNTER — HOSPITAL ENCOUNTER (OUTPATIENT)
Dept: WOUND CARE | Facility: CLINIC | Age: 62
Discharge: HOME | End: 2022-10-04
Attending: SURGERY | Admitting: SURGERY
Payer: OTHER MISCELLANEOUS

## 2022-10-04 VITALS — HEART RATE: 108 BPM | DIASTOLIC BLOOD PRESSURE: 91 MMHG | TEMPERATURE: 97.5 F | SYSTOLIC BLOOD PRESSURE: 163 MMHG

## 2022-10-04 DIAGNOSIS — S31.104D NON-HEALING OPEN WOUND OF LEFT GROIN, SUBSEQUENT ENCOUNTER: Primary | ICD-10-CM

## 2022-10-04 PROCEDURE — 97602 WOUND(S) CARE NON-SELECTIVE: CPT

## 2022-10-04 PROCEDURE — 99213 OFFICE O/P EST LOW 20 MIN: CPT | Performed by: SURGERY

## 2022-10-04 RX ORDER — LIDOCAINE HYDROCHLORIDE 20 MG/ML
JELLY TOPICAL DAILY
Qty: 85 G | Refills: 6 | Status: ON HOLD | OUTPATIENT
Start: 2022-10-04 | End: 2022-11-18

## 2022-10-04 RX ORDER — TRAZODONE HYDROCHLORIDE 100 MG/1
100 TABLET ORAL AT BEDTIME
COMMUNITY
Start: 2022-09-14

## 2022-10-04 NOTE — LETTER
October 4, 2022      Fish Abad  [id number] [address] [id number]              Dear Fish,    Patient was seen in clinic today 10/4/22.  Patient is to be out of work for 6 weeks.  Patient's absence may be extended, depending on subsequent assessments by Dermatology service as patient's work restrictions will be transferred to the Dermatology service while under his/her care.            Sincerely,      Eder Nicole MD

## 2022-10-04 NOTE — DISCHARGE INSTRUCTIONS
Fish Abad      1960    A DME order for supplies has been placed to [facility].  If there are any issues with your order including not receiving the order please call [facility] at [phone] option 3.   They can also provide a tracking number for you if you had supplies shipped to you.    Congratulations on stopping smoking!!  :)     Keep blood sugars below 150 and A1C below 7    Use boxer shorts (not briefs). Keep area open to air when possible/when at home.     Medications/supplements to aid in healing:  Vitamin D3 5,000 iu per day  Chelsea C 1,000 mg take daily  Vitamin B Complex with folic acid take 1 tablet daily  Vitamin B 12 1000 mcg daily  Vein Formula 1000mg. Take 500mg capsule twice daily. Order from www.[employer] or call [phone]. (The 1000mg is two (2) 500mg capsules)     Wound care recommendations to left groin:  - After cleansing with mild unscented soap and water, apply VASHE on gauze, lay into wound bed, let sit for 30 minutes, remove gauze (do not rinse)   - Apply lidocaine gel  - Apply 1/2 sheet of Xeroform 5x9 to cover wound  - Cover with one KerraMax 8x9 pad  - Secure with mesh underwear  Change twice daily     BELLE Nicole M.D. October 4, 2022    Call us at [phone] if you have any questions about your wounds, have redness or swelling around your wound, have a fever of 101 or greater or if you have any other problems or concerns. We answer the phone Monday through Friday 8 am to 4 pm, please leave a message as we check the voicemail frequently throughout the day.     If you had a positive experience please indicate that on your patient satisfaction survey form that [facility] will be sending you.    It was a pleasure meeting with you today.  Thank you for allowing me and my team the privilege of caring for you today.  YOU are the reason we are here, and I truly hope we provided you with the excellent service you deserve.  Please let us  know if there is anything else we can do for you so that we can be sure you are leaving completely satisfied with your care experience.      If you have any billing related questions please call the [facility] Business office at [phone]. The clinic staff does not handle billing related matters.    If you are scheduled to have a follow up appointment, you will receive a reminder call the day before your visit. On the appointment day please arrive 15 minutes prior to your appointment time. If you are unable to keep that appointment, please call the clinic to cancel or reschedule. If you are more than 10 minutes late or greater for your appointment, the clinic policy is that you may be asked to reschedule.

## 2022-10-04 NOTE — PROGRESS NOTES
[facility] Wound Healing [facility] Progress Note    Subject: Fish Abad basal cell malignancy left medial groin and lateral aspect of scrotum, pending evaluation with [facility] Mohs surgeon within the next 10 days.  QR C is present, he has a low back injury related to work, cannot have low back surgery until the groin basal cell malignancy with associated wound is completely healed.    Patient Active Problem List   Diagnosis     Acute bilateral low back pain with right-sided sciatica     Allergic rhinitis     CPAP (continuous positive airway pressure) dependence     Decreased activity tolerance     Hx of tonsillectomy     Left renal stone     FRANKY (obstructive sleep apnea)     Weakness of trunk musculature     Non-healing left groin open wound     No past medical history on file.  Exam:  BP (!) 163/91 (BP Location: Left arm, Patient Position: Sitting)   Pulse 108   Temp 97.5  F (36.4  C) (Temporal)   Wound (used by OP [facility] only) 08/03/22 Left groin (Active)   Thickness/Stage full thickness 10/04/22 0954   Base granulating 10/04/22 0954   Periwound pink 10/04/22 0954   Periwound Temperature warm 10/04/22 0954   Periwound Skin Turgor soft 10/04/22 0954   Edges open 10/04/22 0954   Length (cm) 13.5 10/04/22 0954   Width (cm) 13 10/04/22 0954   Depth (cm) 0.2 10/04/22 0954   Wound (cm^2) 175.5 cm^2 10/04/22 0954   Wound Volume (cm^3) 35.1 cm^3 10/04/22 0954   Wound healing % -6.36 10/04/22 0954   Drainage Characteristics/Odor serosanguineous 10/04/22 0954   Drainage Amount moderate 10/04/22 0954   Care, Wound non-select wound debridement performed 10/04/22 0954     Left groin wound, lateral aspect of scrotum, no cellulitis, wound is granulation tissue, hypersensitive.        Impression: Basal cell malignancy left groin with lateral aspect of scrotum involved, low back injury work-related    Plan: Can utilize a lidocaine gel and apply 1-2 times a day, cover with Xeroform.  Follow-up with Mohs surgeon for  definitive management.  We will follow-up with us late November 2022.  Letter provided to be off work additional 6 weeks, when his care has transferred for definitive management to dermatology, we will determine return to work.  Continue adjunct of bio nutrients including vitamin D, C, B12, B6, folate.          Further instructions from your care team       Fish Abad      1960    A DME order for supplies has been placed to [facility].  If there are any issues with your order including not receiving the order please call [facility] at [phone] option 3.   They can also provide a tracking number for you if you had supplies shipped to you.    Congratulations on stopping smoking!!  :)     Keep blood sugars below 150 and A1C below 7    Use boxer shorts (not briefs). Keep area open to air when possible/when at home.     Medications/supplements to aid in healing:  Vitamin D3 5,000 iu per day  Chelsea C 1,000 mg take daily  Vitamin B Complex with folic acid take 1 tablet daily  Vitamin B 12 1000 mcg daily  Vein Formula 1000mg. Take 500mg capsule twice daily. Order from www.[employer] or call [phone]. (The 1000mg is two (2) 500mg capsules)     Wound care recommendations to left groin:  - After cleansing with mild unscented soap and water, apply VASHE on gauze, lay into wound bed, let sit for 30 minutes, remove gauze (do not rinse)   - Apply lidocaine gel  - Apply 1/2 sheet of Xeroform 5x9 to cover wound  - Cover with one KerraMax 8x9 pad  - Secure with mesh underwear  Change twice daily     BELLE Nicole M.D. October 4, 2022    Call us at [phone] if you have any questions about your wounds, have redness or swelling around your wound, have a fever of 101 or greater or if you have any other problems or concerns. We answer the phone Monday through Friday 8 am to 4 pm, please leave a message as we check the voicemail frequently throughout the day.     If you had a positive  experience please indicate that on your patient satisfaction survey form that [facility] will be sending you.    It was a pleasure meeting with you today.  Thank you for allowing me and my team the privilege of caring for you today.  YOU are the reason we are here, and I truly hope we provided you with the excellent service you deserve.  Please let us know if there is anything else we can do for you so that we can be sure you are leaving completely satisfied with your care experience.      If you have any billing related questions please call the [facility] Business office at [phone]. The clinic staff does not handle billing related matters.    If you are scheduled to have a follow up appointment, you will receive a reminder call the day before your visit. On the appointment day please arrive 15 minutes prior to your appointment time. If you are unable to keep that appointment, please call the clinic to cancel or reschedule. If you are more than 10 minutes late or greater for your appointment, the clinic policy is that you may be asked to reschedule.         Durable Medical Equipment Wound Care Orders     Wound Care Order for DME - ONLY FOR DME   As directed      DME Provider: [facility] Comment - [phone]    Wound Supply Order Options: Complex Wound    Optional: .dmewound can be used to pull in order specific information into documentation    Wound Number: Wound 1    Wound 1 Location: left groin    Wound 1 Dressing Change Frequency: BID    Wound 1 Length of Need: 30 days    Wound 1 - Dressing Supplies:  Other Dressing Supplies  Secondary  Wrap/Gauze  Cleanser  Tape/Securing       Wound 1 - Secondary Dressing Dispensing Instructions: Ok to Substitute    Wound 1 - Secondary Dressing Types: Absorbant    Wound 1 - Absorbant Types: Other (Comments)    Wound 1 - Other Absorbant Dressing Size: kerramax 8x9    Wound 1 - Other Absorbant Dressing Quantity: 90- patient needs twice daily and will not return until  second half of november    Wound 1 - Wrap/Gauze Types: Loafs    Wound 1 - Pad Type: Gauze Loaf []    Wound 1 - Gauze Pad Size: 4 x 4    Wound 1 - Gauze Pad Quantity: 200    Wound 1 - Tubular Dressing Type: Other (Comments)    Wound 1 - Other Tubular Dressing Size: mesh pants    Wound 1 - Other Tubular Dressing Quantity: 45    Wound 1 - Cleanser Types: Vashe    Wound 1 - Vashe Cleanser Size: 8.5 oz    Wound 1 - Vashe Cleanser Quantity: one bottle    Wound 1 - Other Dressing Supplies Type: Non-Adherent    Wound 1 - Non-Adherent Type: Curad Xeroform [/]    Wound 1 - Curad Xeroform Size: 5x9    Wound 1 - Curad Xeroform Quantity: Other (Comments) Comment - 45        Eder Nicole MD on 10/4/2022 at 12:30 PM    Dictated using Dragon voice recognition software which may result in transcription errors

## 2022-10-12 ENCOUNTER — OFFICE VISIT (OUTPATIENT)
Dept: DERMATOLOGY | Facility: CLINIC | Age: 62
End: 2022-10-12
Payer: COMMERCIAL

## 2022-10-12 ENCOUNTER — PRE VISIT (OUTPATIENT)
Dept: DERMATOLOGY | Facility: CLINIC | Age: 62
End: 2022-10-12

## 2022-10-12 DIAGNOSIS — C44.719 BASAL CELL CARCINOMA (BCC) OF LEFT THIGH: Primary | ICD-10-CM

## 2022-10-12 DIAGNOSIS — R45.89 DEPRESSED MOOD: ICD-10-CM

## 2022-10-12 PROCEDURE — 96127 BRIEF EMOTIONAL/BEHAV ASSMT: CPT | Mod: GC | Performed by: DERMATOLOGY

## 2022-10-12 PROCEDURE — 99214 OFFICE O/P EST MOD 30 MIN: CPT | Mod: GC | Performed by: DERMATOLOGY

## 2022-10-12 ASSESSMENT — COLUMBIA-SUICIDE SEVERITY RATING SCALE - C-SSRS
1. WITHIN THE PAST MONTH, HAVE YOU WISHED YOU WERE DEAD OR WISHED YOU COULD GO TO SLEEP AND NOT WAKE UP?: YES
3. IN THE PAST MONTH, HAVE YOU BEEN THINKING ABOUT HOW YOU MIGHT KILL YOURSELF?: NO
6. HAVE YOU EVER DONE ANYTHING, STARTED TO DO ANYTHING, OR PREPARED TO DO ANYTHING TO END YOUR LIFE?: NO
1. WITHIN THE PAST MONTH, HAVE YOU WISHED YOU WERE DEAD OR WISHED YOU COULD GO TO SLEEP AND NOT WAKE UP?: YES
5. IN THE PAST MONTH, HAVE YOU STARTED TO WORK OUT OR WORKED OUT THE DETAILS OF HOW TO KILL YOURSELF? DO YOU INTEND TO CARRY OUT THIS PLAN?: NO
2. IN THE PAST MONTH, HAVE YOU ACTUALLY HAD ANY THOUGHTS OF KILLING YOURSELF?: YES
4. IN THE PAST MONTH, HAVE YOU HAD THESE THOUGHTS AND HAD SOME INTENTION OF ACTING ON THEM?: NO
5. IN THE PAST MONTH, HAVE YOU STARTED TO WORK OUT OR WORKED OUT THE DETAILS OF HOW TO KILL YOURSELF? DO YOU INTEND TO CARRY OUT THIS PLAN?: NO
2. IN THE PAST MONTH, HAVE YOU ACTUALLY HAD ANY THOUGHTS OF KILLING YOURSELF?: YES
6. HAVE YOU EVER DONE ANYTHING, STARTED TO DO ANYTHING, OR PREPARED TO DO ANYTHING TO END YOUR LIFE?: NO
3. IN THE PAST MONTH, HAVE YOU BEEN THINKING ABOUT HOW YOU MIGHT KILL YOURSELF?: NO
5. IN THE PAST MONTH, HAVE YOU STARTED TO WORK OUT OR WORKED OUT THE DETAILS OF HOW TO KILL YOURSELF? DO YOU INTEND TO CARRY OUT THIS PLAN?: NO

## 2022-10-12 ASSESSMENT — PATIENT HEALTH QUESTIONNAIRE - PHQ9: SUM OF ALL RESPONSES TO PHQ QUESTIONS 1-9: 20

## 2022-10-12 ASSESSMENT — PAIN SCALES - GENERAL: PAINLEVEL: MILD PAIN (3)

## 2022-10-12 NOTE — NURSING NOTE
Chief Complaint   Patient presents with     Derm Problem     Patient is here today for mohs consult left groin.      Shira DUVAL CMA

## 2022-10-12 NOTE — LETTER
"10/12/2022       RE: Fish Abad  [address] [address] [address] [facility] [id number]     Dear Colleague,    Thank you for referring your patient, Fish Abad, to the [facility] DERMATOLOGIC SURGERY CLINIC [place] at [facility]. Please see a copy of my visit note below.    Mohs Micrographic Surgery Consult Note    Oct 12, 2022    Dermatology Problem List:  1. BCC, L groin, s/p bx 8/31/22    Subjective: The patient is a 62 year old man who presents today for Mohs micrographic surgery consultation for a recent diagnosis of skin cancer.    Skin cancer(s): BCC  Location(s): L groin  Associated symptoms: pruritus, tenderness to touch  Onset: unknown    Patient reports severe pain with injection of local anesthetic in the past. This has caused severe pain and per the patient \"permanent stress related hearing loss.\" Patient is extremely apprehensive about surgery and is wishing to avoid use of local anesthetic if at all possible. His anxiety about the procedure and potential complications has caused significant depression.    No other associated symptoms, modifying factors, or prior treatments, except when noted above. The patient denies any constitutional symptoms, lymphadenopathy, unintentional weight loss or decreased appetite. No other skin concerns today.    Objective:   Gen: This is a well appearing individual in no acute distress. The patient is alert and oriented x 3.  An exam of the left groin was performed today and visualized the following:  - 16 x 13 cm ulcerated palque on the left upper inner thigh/inguinal region    Assessment and Plan:     1. Plan for Mohs micrographic surgery for skin cancer(s) above:  - We discussed the nature of the diagnosis/condition above. We discussed the treatment options, including the risks benefits and expectations of these options. We recommend micrographic surgery as the most effective and most tissue sparing " option for treatment, and the patient agrees to proceed with this.   - We attempt to arrange for surgery under sedation as he will likely be unable to tolerate proper surgical positioning secondary to documented back pain  - The patient is aware of the risks, benefits and expectations of this procedure. The patient will be scheduled for this procedure, if not already done so.  - We anticipate the following closure type: Sliding or lifting flap    The patient was discussed with and evaluated by attending physician, Clifford Hernandez MD.    Felipe Bourne MD  Dermatology, PGY-5  Mohs surgery fellow    Scribe Disclosure:  I, Yomi Chino, am serving as a scribe to document services personally performed by Clifford Hernandez MD based on data collection and the provider's statements to me.     Attending Attestation  I attest that the Fellow recorded the interview and exam that I personally performed.  I have reviewed the note and edited it as necessary.    Clifford Hernandez M.D.  Professor  Director of Dermatologic Surgery  Department of Dermatology  [facility]        Again, thank you for allowing me to participate in the care of your patient.      Sincerely,    Clifford Hernandez MD

## 2022-10-12 NOTE — LETTER
Date:October 18, 2022      Provider requested that no letter be sent. Do not send.       [facility]

## 2022-10-12 NOTE — Clinical Note
"FYI on this patient who claims to have \"stress related permanent hearing loss\" secondary to a local anesthesia injection in his thigh at the [facility] wound care clinic."

## 2022-10-12 NOTE — NURSING NOTE
Depression Screening Follow-up    PHQ 10/12/2022   PHQ-9 Total Score 20   Q9: Thoughts of better off dead/self-harm past 2 weeks Several days             Follow Up        Pt states he is Safe, he has NO plans of self harm. Agrees to a behavioral health referral.    Crisis resource information provided in the After Visit Summary  Pended mental health referral and notified provider    Yesi Schneider RN

## 2022-10-12 NOTE — PROGRESS NOTES
"Mohs Micrographic Surgery Consult Note    Oct 12, 2022    Dermatology Problem List:  1. BCC, L groin, s/p bx 8/31/22    Subjective: The patient is a 62 year old man who presents today for Mohs micrographic surgery consultation for a recent diagnosis of skin cancer.    Skin cancer(s): BCC  Location(s): L groin  Associated symptoms: pruritus, tenderness to touch  Onset: unknown    Patient reports severe pain with injection of local anesthetic in the past. This has caused severe pain and per the patient \"permanent stress related hearing loss.\" Patient is extremely apprehensive about surgery and is wishing to avoid use of local anesthetic if at all possible. His anxiety about the procedure and potential complications has caused significant depression.    No other associated symptoms, modifying factors, or prior treatments, except when noted above. The patient denies any constitutional symptoms, lymphadenopathy, unintentional weight loss or decreased appetite. No other skin concerns today.    Objective:   Gen: This is a well appearing individual in no acute distress. The patient is alert and oriented x 3.  An exam of the left groin was performed today and visualized the following:  - 16 x 13 cm ulcerated palque on the left upper inner thigh/inguinal region    Assessment and Plan:     1. Plan for Mohs micrographic surgery for skin cancer(s) above:  - We discussed the nature of the diagnosis/condition above. We discussed the treatment options, including the risks benefits and expectations of these options. We recommend micrographic surgery as the most effective and most tissue sparing option for treatment, and the patient agrees to proceed with this.   - We attempt to arrange for surgery under sedation as he will likely be unable to tolerate proper surgical positioning secondary to documented back pain  - The patient is aware of the risks, benefits and expectations of this procedure. The patient will be scheduled for this " procedure, if not already done so.  - We anticipate the following closure type: Sliding or lifting flap    The patient was discussed with and evaluated by attending physician, Clifford Hernandez MD.    Felipe Bourne MD  Dermatology, PGY-5  Mohs surgery fellow    Scribe Disclosure:  I, Yomi Chino, am serving as a scribe to document services personally performed by Clifford Hernandez MD based on data collection and the provider's statements to me.     Attending Attestation  I attest that the Fellow recorded the interview and exam that I personally performed.  I have reviewed the note and edited it as necessary.    Clifford Hernandez M.D.  Professor  Director of Dermatologic Surgery  Department of Dermatology  [facility]

## 2022-10-17 ENCOUNTER — TELEPHONE (OUTPATIENT)
Dept: DERMATOLOGY | Facility: CLINIC | Age: 62
End: 2022-10-17
Payer: COMMERCIAL

## 2022-10-17 DIAGNOSIS — C44.719 BCC (BASAL CELL CARCINOMA), LEG, LEFT: Primary | ICD-10-CM

## 2022-10-17 NOTE — TELEPHONE ENCOUNTER
[facility] Call Center    Phone Message    May a detailed message be left on voicemail: yes     Reason for Call: Other: Pt states he has a mohs surgery on 11/2/2022, he states Dr. Hernandez needs to reschedule this appt with an OR. Pt is wondering when or if that has been scheduled yet. Please call Pt back to discuss. Thank you.     Action Taken: Message routed to:  [facility] ([facility] derm    Travel Screening: Not Applicable

## 2022-10-19 PROBLEM — C44.719: Status: ACTIVE | Noted: 2022-10-19

## 2022-10-19 NOTE — TELEPHONE ENCOUNTER
FUTURE VISIT INFORMATION      SURGERY INFORMATION:    Date: 22    Location: [facility] or    Surgeon:  Clifford Hernandez MD    Anesthesia Type:  MAC with Local    Procedure: MOHS MICROGRAPHIC SURGERY with flap closure CLOSURE, MOHS PROCEDURE DEFECT thigh    Consult: ov 10/12/22    RECORDS REQUESTED FROM:       Pertinent Medical History: FRANKY    Most recent EKG+ Tracin22- [facility]    Most recent Sleep Study:  7/3/17- [facility]

## 2022-10-20 NOTE — TELEPHONE ENCOUNTER
Patient has been scheduled for an OR appointment the following date with Dr. Hernandez:    Date: 11/11/2022 Status: Scheduled   Time: 9:55 AM Length (mins): 245   Physicians: Clifford Hernandez MD [[id number]]  Clifford Hernandez MD [[id number]] Procedure(s): MOHS MICROGRAPHIC SURGERY with flap closure [[id number]]  CLOSURE, MOHS PROCEDURE DEFECT thigh [[id number]]     Debra ROSARIO RN

## 2022-10-21 ENCOUNTER — TELEPHONE (OUTPATIENT)
Dept: WOUND CARE | Facility: CLINIC | Age: 62
End: 2022-10-21

## 2022-10-21 DIAGNOSIS — S31.104D NON-HEALING OPEN WOUND OF LEFT GROIN, SUBSEQUENT ENCOUNTER: Primary | ICD-10-CM

## 2022-10-21 NOTE — TELEPHONE ENCOUNTER
Patient returned call to clinic. He would like the VASHE ordered through the home medical store and billed to work comp. Order placed. No further questions or concerns.

## 2022-10-29 ENCOUNTER — HEALTH MAINTENANCE LETTER (OUTPATIENT)
Age: 62
End: 2022-10-29

## 2022-11-04 ENCOUNTER — LAB (OUTPATIENT)
Dept: LAB | Facility: CLINIC | Age: 62
End: 2022-11-04
Payer: COMMERCIAL

## 2022-11-04 ENCOUNTER — PRE VISIT (OUTPATIENT)
Dept: SURGERY | Facility: CLINIC | Age: 62
End: 2022-11-04

## 2022-11-04 ENCOUNTER — ANESTHESIA EVENT (OUTPATIENT)
Dept: SURGERY | Facility: AMBULATORY SURGERY CENTER | Age: 62
End: 2022-11-04
Payer: COMMERCIAL

## 2022-11-04 ENCOUNTER — OFFICE VISIT (OUTPATIENT)
Dept: SURGERY | Facility: CLINIC | Age: 62
End: 2022-11-04
Payer: COMMERCIAL

## 2022-11-04 ENCOUNTER — TELEPHONE (OUTPATIENT)
Dept: SURGERY | Facility: CLINIC | Age: 62
End: 2022-11-04

## 2022-11-04 VITALS
OXYGEN SATURATION: 98 % | HEART RATE: 82 BPM | BODY MASS INDEX: 39.67 KG/M2 | DIASTOLIC BLOOD PRESSURE: 87 MMHG | SYSTOLIC BLOOD PRESSURE: 142 MMHG | RESPIRATION RATE: 20 BRPM | TEMPERATURE: 97.9 F | HEIGHT: 72 IN | WEIGHT: 292.9 LBS

## 2022-11-04 DIAGNOSIS — E83.42 HYPOMAGNESEMIA: ICD-10-CM

## 2022-11-04 DIAGNOSIS — Z01.818 PREOP EXAMINATION: Primary | ICD-10-CM

## 2022-11-04 DIAGNOSIS — E11.9 TYPE 2 DIABETES MELLITUS WITHOUT COMPLICATION, WITHOUT LONG-TERM CURRENT USE OF INSULIN (H): ICD-10-CM

## 2022-11-04 DIAGNOSIS — Z01.818 PREOP EXAMINATION: ICD-10-CM

## 2022-11-04 DIAGNOSIS — I10 PRIMARY HYPERTENSION: ICD-10-CM

## 2022-11-04 DIAGNOSIS — C44.719 BCC (BASAL CELL CARCINOMA), LEG, LEFT: ICD-10-CM

## 2022-11-04 LAB
HBA1C MFR BLD: 7.5 %
MAGNESIUM SERPL-MCNC: 1.7 MG/DL (ref 1.7–2.3)

## 2022-11-04 PROCEDURE — 99000 SPECIMEN HANDLING OFFICE-LAB: CPT | Performed by: PATHOLOGY

## 2022-11-04 PROCEDURE — 36415 COLL VENOUS BLD VENIPUNCTURE: CPT | Performed by: PATHOLOGY

## 2022-11-04 PROCEDURE — 99204 OFFICE O/P NEW MOD 45 MIN: CPT | Performed by: NURSE PRACTITIONER

## 2022-11-04 PROCEDURE — 83735 ASSAY OF MAGNESIUM: CPT | Performed by: PATHOLOGY

## 2022-11-04 PROCEDURE — 83036 HEMOGLOBIN GLYCOSYLATED A1C: CPT | Performed by: PATHOLOGY

## 2022-11-04 RX ORDER — CALCIUM CARBONATE/VITAMIN D3 500-10/5ML
400 LIQUID (ML) ORAL EVERY MORNING
COMMUNITY

## 2022-11-04 RX ORDER — ESCITALOPRAM OXALATE 5 MG/1
5 TABLET ORAL DAILY
COMMUNITY
Start: 2022-10-17

## 2022-11-04 ASSESSMENT — LIFESTYLE VARIABLES: TOBACCO_USE: 1

## 2022-11-04 ASSESSMENT — PAIN SCALES - GENERAL: PAINLEVEL: MODERATE PAIN (5)

## 2022-11-04 ASSESSMENT — COPD QUESTIONNAIRES: COPD: 0

## 2022-11-04 ASSESSMENT — ENCOUNTER SYMPTOMS: ORTHOPNEA: 0

## 2022-11-04 NOTE — H&P
Pre-Operative H & P     CC:  Preoperative exam to assess for increased cardiopulmonary risk while undergoing surgery and anesthesia.    Date of Encounter: 11/4/2022  Primary Care Physician:  No Ref-Primary, Physician     Reason for visit:   Encounter Diagnoses   Name Primary?     Preop examination Yes     BCC (basal cell carcinoma), leg, left      Type 2 diabetes mellitus without complication, without long-term current use of insulin (H)      Hypomagnesemia      Primary hypertension        HPI  Fish Abad is a 62 year old male who presents for pre-operative H & P in preparation for  Procedure Information     Case: [id number] Date/Time: 11/11/22 0955    Procedures:       MOHS MICROGRAPHIC SURGERY with flap closure (Left: Update)      CLOSURE, MOHS PROCEDURE DEFECT thigh (Left: Face)    Anesthesia type: MAC with Local    Diagnosis: BCC (basal cell carcinoma), leg, left [C44.719]    Pre-op diagnosis: BCC (basal cell carcinoma), leg, left [C44.719]    Location: [place] [address] / [facility]    Providers: Clifford Hernandez MD          Fish Abad is a 62 year old male with hypertension, hyperlipidemia, allergic rhinitis, chronic back pain, diabetes, GERD and depression that has a left groin BCC.  He notes that approximately 9 years ago he started taking Invokana for his diabetes and shortly after starting it noticed an open wound in the left groin (he notes that wounds are a documented side effect of this drug).  Over time it fluctuated in size, but apparently never fully healed which was thought to be likely due in part to diabetes, his occupation as a  (sitting a lot) and obesity.  He started seeing wound care here this past summer as he was hoping to find a way to get full resolution so he could later have spinal surgery.  After the wound wasn't having any notable improvement despite wound care a biopsy was done to evaluate further and resulted back as  BCC.  The lesion is noted to be 16 x 13cm.  He was subsequently referred to Dr. Hernandez for surgical consideration.  The above listed procedure has now been recommended for treatment.       History is obtained from the patient and chart review    Hx of abnormal bleeding or anti-platelet use: aspirin      Past Medical History  No past medical history on file.    Past Surgical History  Past Surgical History:   Procedure Laterality Date     LITHOTRIPSY  03/2022     VASECTOMY         Prior to Admission Medications  Current Outpatient Medications   Medication Sig Dispense Refill     acetaminophen 500 MG CAPS Take 1,000 mg by mouth as needed       aluminum chloride (DRYSOL) 20 % external solution Apply topically daily Apply to wound margins daily with wound care. (Patient taking differently: Apply topically daily as needed Apply to wound margins daily with wound care.) 60 mL 1     aspirin (ASA) 81 MG chewable tablet 81 mg every morning       atorvastatin (LIPITOR) 20 MG tablet Take 20 mg by mouth every evening       Bioflavonoid Products (MELINA-C PO) Take 1,000 mg by mouth every morning       escitalopram (LEXAPRO) 5 MG tablet Take 5 mg by mouth daily       fexofenadine (ALLEGRA) 60 MG tablet Take 60 mg by mouth every morning       fluticasone (FLONASE) 50 MCG/ACT nasal spray Spray 2 sprays in nostril as needed       ibuprofen (ADVIL/MOTRIN) 200 MG capsule Take 400 mg by mouth as needed       lidocaine (XYLOCAINE) 2 % external gel Apply topically daily (Patient taking differently: Apply topically as needed) 85 g 6     losartan (COZAAR) 25 MG tablet Take 25 mg by mouth 2 times daily       magnesium oxide 400 MG CAPS Take by mouth every morning       metFORMIN (GLUCOPHAGE XR) 500 MG 24 hr tablet 2 times daily (with meals)       nystatin (MYCOSTATIN) 251967 UNIT/GM external powder 2 times daily as needed       traZODone (DESYREL) 100 MG tablet Take 100 mg by mouth At Bedtime       vitamin B complex with vitamin C (VITAMIN  B  COMPLEX) tablet Take 1 tablet by mouth every morning       vitamin B-12 (CYANOCOBALAMIN) 2000 MCG tablet Take 2,000 mcg by mouth 2 times daily       Vitamin D3 (CHOLECALCIFEROL) 125 MCG (5000 UT) tablet Take by mouth every morning       atorvastatin (LIPITOR) 10 MG tablet TAKE 1 TABLET BY MOUTH ONE TIME DAILY (Patient not taking: Reported on 10/12/2022)       cyclobenzaprine (FLEXERIL) 10 MG tablet TAKE 1 TABLET BY MOUTH THREE TIMES DAILY AS NEEDED FOR 7 DAYS (Patient not taking: Reported on 2022)       DULoxetine (CYMBALTA) 30 MG capsule TAKE 1 CAPSULE BY MOUTH ONE TIME DAILY (Patient not taking: Reported on 2022)       fexofenadine (ALLEGRA) 180 MG tablet Take 180 mg by mouth (Patient not taking: Reported on 2022)       ibuprofen (ADVIL/MOTRIN) 200 MG tablet Take 400 mg by mouth       metFORMIN (GLUCOPHAGE) 425 mg half-tab Take 500 mg by mouth (Patient not taking: Reported on 2022)       traZODone (DESYREL) 50 MG tablet TAKE 1/2 TO 1 TABLET BY MOUTH DAILY AT BEDTIME         Allergies  Allergies   Allergen Reactions     Canagliflozin Unknown     Food GI Disturbance     Lactose      Penicillins Unknown and Rash     Seasonal Allergies Other (See Comments)       Social History  Social History     Socioeconomic History     Marital status:      Spouse name: Not on file     Number of children: 4     Years of education: Not on file     Highest education level: Not on file   Occupational History     Occupation:    Tobacco Use     Smoking status: Former     Packs/day: 0.10     Years: 12.00     Pack years: 1.20     Types: Cigarettes     Quit date: 2022     Years since quittin.2     Smokeless tobacco: Never   Substance and Sexual Activity     Alcohol use: Yes     Alcohol/week: 5.0 standard drinks     Types: 5 Cans of beer per week     Comment: Occasionally     Drug use: Never     Sexual activity: Not on file   Other Topics Concern     Not on file   Social History Narrative     Not  "on file     Social Determinants of Health     Financial Resource Strain: Not on file   Food Insecurity: Not on file   Transportation Needs: Not on file   Physical Activity: Not on file   Stress: Not on file   Social Connections: Not on file   Intimate Partner Violence: Not on file   Housing Stability: Not on file       Family History  Family History   Problem Relation Age of Onset     Diabetes Mother      Prostate Cancer Father      Anesthesia Reaction No family hx of      Thrombosis No family hx of        Review of Systems  The complete review of systems is negative other than noted in the HPI or here.   Anesthesia Evaluation   Pt has had prior anesthetic.     History of anesthetic complications   severe pain with local anesthesia -see notes in H&P.    ROS/MED HX  ENT/Pulmonary: Comment: Denies sleep apnea -reports negative test in 2017    (+) FRANKY risk factors, hypertension, obese, allergic rhinitis, tobacco use, Past use,  (-) asthma, COPD and sleep apnea   Neurologic: Comment: \"pinched nerve in my lower back that causes this area (pointing to his lower abdomen/pelvic area) to overheat\" - neg neurologic ROS     Cardiovascular:     (+) Dyslipidemia hypertension-----Previous cardiac testing   Echo: Date: Results:    Stress Test: Date: Results:    ECG Reviewed: Date: 9/5/22 Results:    Cath: Date: Results:   (-) orthopnea/PND   METS/Exercise Tolerance: 3 - Able to walk 1-2 blocks without stopping    Hematologic:  - neg hematologic  ROS  (-) history of blood clots and history of blood transfusion   Musculoskeletal: Comment: Chronic low back pain with BLE radicular symptoms  Uses a cane for ambulation    Last fall was in early October      GI/Hepatic:     (+) GERD, Asymptomatic on medication,     Renal/Genitourinary: Comment: History of kidney stones      Endo:     (+) type II DM, Not using insulin, - not using insulin pump. Normal glucose range: unknown,     Psychiatric/Substance Use:  - neg psychiatric ROS   (+) " psychiatric history depression (insomnia)     Infectious Disease:  - neg infectious disease ROS  (-) Recent Fever   Malignancy:   (+) Malignancy, History of Skin.Skin CA Active status post.        Other: Comment: Left groin BCC     (+) , H/O Chronic Pain, (-) Any chance pregnant       BP (!) 142/87 (BP Location: Right arm, Patient Position: Chair, Cuff Size: Adult Large)   Pulse 82   Temp 97.9  F (36.6  C) (Oral)   Resp 20   Ht 1.829 m (6')   Wt 132.9 kg (292 lb 14.4 oz)   SpO2 98%   BMI 39.72 kg/m      Physical Exam   Constitutional: Awake, alert, cooperative, no apparent distress, and appears stated age.  obese  Eyes: Pupils equal, round and reactive to light, extra ocular muscles intact, sclera clear, conjunctiva normal.  HENT: Normocephalic, oral pharynx with moist mucus membranes, poor dentition. No goiter appreciated.  [place]  Respiratory: Clear to auscultation bilaterally, no crackles or wheezing.  Cardiovascular: Regular rate and rhythm, normal S1 and S2, and no murmur noted.  Carotids +2, no bruits. No edema. Palpable pulses to radial  DP and PT arteries.   GI: exam difficult due to body habitus.  Normal bowel sounds, soft, non-distended, non-tender, no masses or hepatosplenomegaly appreciated.   Lymph/Hematologic: No cervical lymphadenopathy and no supraclavicular lymphadenopathy.  Genitourinary:  deferred  Skin: Warm and dry.    Musculoskeletal: Full ROM of neck. There is no redness, warmth, or swelling of the exposed joints. Gross motor strength is normal.    Neurologic: Awake, alert, oriented to name, place and time. Cranial nerves II-XII are grossly intact. Gait is impaired   Neuropsychiatric: Calm, cooperative. Normal affect.      Labs/Diagnostic Studies   All labs and imaging personally reviewed     EKG  9/5/22  NSR    Echocardiogram:   11/7/22  Interpretation Summary  Left ventricular size, wall motion and function are normal. The ejection  fraction is 55-60%.  The right ventricle is normal  size. Global right ventricular function is  normal.  No significant valvular abnormalities were noted.  No pericardial effusion is present.    Labs  Component      Latest Ref Rng & Units 11/4/2022   Magnesium      1.7 - 2.3 mg/dL 1.7   Hemoglobin A1C      <5.7 % 7.5 (H)       Outside labs  9/5/22  COMPREHENSIVE METABOLIC PANEL  Order: [id number]   suggestion  Information displayed in this report may not trend or trigger automated decision support.      Ref Range & Units 2 mo ago   Sodium 134 - 143 mEq/L 138    Potassium 3.4 - 5.1 mEq/L 4.0    Chloride 99 - 110 mEq/L 102    Carbon Dioxide 19 - 29 mEq/L 23    Anion Gap 3.0 - 15.0 mEq/L 13.0    Blood Urea Nitrogen 5 - 24 mg/dL 12    Creatinine 0.70 - 1.20 mg/dL 0.73    Glomerular Filtration Rate >60 mL/min/1.73 m*2 >60    Comment: Complications of CKD and risk of cardiovascular disease increase when GFR is below 60ml.min/1.73m2.  A persistently reduced GFR is a specific indication of Chronic Kidney Disease.  The eGFR calculation has not been validated in patients >70yrs.   Calcium 8.4 - 10.5 mg/dL 9.4    Glucose 70 - 99 mg/dL 134 High     Protein, Total 6.0 - 8.0 g/dL 7.0    Albumin 3.5 - 5.0 g/dL 3.3 Low     Alkaline Phosphatase 40 - 150 IU/L 78    Aspartate Aminotransferase 10 - 40 IU/L 13    Alanine Aminotransferase 6 - 40 IU/L 17    Bilirubin, Total 0.2 - 1.2 mg/dL 0.5    Narrative    Current ADA criteria for Glucose:       Normal: 70-99 mg/dL       Impaired Fasting Glucose: 100-125 mg/dL       Diabetes Mellitus: at or above 126 mg/dL   The diagnosis of diabetes must be confirmed on a subsequent day by measuring Fasting Plasma Glucose, 2-hr PG or random plasma glucose (if symptoms are present).  Specimen Collected: 09/05/22  4:17 PM Last Resulted: 09/05/22  4:41 PM   Received From: [facility]  Result Received: 11/04/22 10:11 AM     View Encounter      Received Information   Contains abnormal data HEMOGRAM/DIFFERENTIAL  Order:  [id number]   suggestion  Information displayed in this report may not trend or trigger automated decision support.      Ref Range & Units 2 mo ago   WBC 3.2 - 11.0 10*9/L 11.8 High     RBC 4.14 - 5.76 10*12/L 4.72    HGB 12.9 - 16.9 g/dL 13.3    HCT 38.4 - 49.7 % 40.8    MCV 81.4 - 99.0 fL 86.4    MCH 26.7 - 33.1 pg 28.2    MCHC 31.6 - 35.5 g/dL 32.6    RDW 11.3 - 14.6 % 13.8     - 375 10*9/L 228    Neutrophils % % 88.1    Lymphocytes % % 4.8    Monocytes % % 6.1    Eosinophils % % 0.5    Basophils % % 0.3    Immature Granulocytes % % 0.2    Neutrophils Absolute 1.5 - 7.6 10*9/L 10.4 High     Lymphocytes Absolute 0.8 - 3.3 10*9/L 0.6 Low     Monocytes Absolute 0.2 - 0.9 10*9/L 0.7    Eosinophils Absolute 0.0 - 0.4 10*9/L 0.1    Basophils Absolute 0.0 - 0.1 10*9/L 0.0    Immature Granulocytes Absolute 0.00 - 0.06 10*9/L 0.         The patient's records and results personally reviewed by this provider.     Outside records reviewed from: Care Everywhere        Assessment      Fihs Abad is a 62 year old male seen as a PAC referral for risk assessment and optimization for anesthesia.    Plan/Recommendations  Pt will be optimized for the proposed procedure.  See below for details on the assessment, risk, and preoperative recommendations    NEUROLOGY  - No history of TIA, CVA or seizure    -Post Op delirium risk factors:  No risk identified    ENT  - No current airway concerns.  Will need to be reassessed day of surgery.  Mallampati: III  TM: > 3    CARDIAC  - Hypertension is managed with osartan. May continue with no inerruption prior to surgery.         - METS (Metabolic Equivalents) = 3.   He doesn't exercise purposefully and otherwise isn't active due to chronic back pain for >1 year.  He notes that he walked a block to the building today with no cardiopulmonary symptoms, just back pain.  Echocardiogram ordered for further evaluation due to limited activity.    Patient CANNOT perform 4 METS exercise  without symptoms            Total Score: 1    Functional Capacity: Unable to complete 4 METS      RCRI-Very low risk: Class 1 0.4% complication rate            Total Score: 0        PULMONARY  - sleep apnea is a noted diagnosis on his chart.  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  Monitor closely.    FRANKY Medium Risk            Total Score: 4    FRANKY: Hypertension    FRANKY: BMI over 35 kg/m2    FRANKY: Over 50 ys old    FRANKY: Male      - Denies asthma or inhaler use  - Tobacco History      History   Smoking Status     Former     Packs/day: 0.10     Years: 12.00     Types: Cigarettes     Quit date: 08/2022   Smokeless Tobacco     Never       GI  - GERD is well controlled with omeprazole.   PONV Medium Risk  Total Score: 2           1 AN PONV: Patient is not a current smoker    1 AN PONV: Intended Post Op Opioids            ENDOCRINE    - BMI: Estimated body mass index is 39.72 kg/m  as calculated from the following:    Height as of this encounter: 1.829 m (6').    Weight as of this encounter: 132.9 kg (292 lb 14.4 oz).  Obesity (BMI >30)  - Diabetes  Diabetes Mellitus, Type 2, non-insulin dependent.  Hold morning oral hypoglycemic medications. Recommend close monitoring of the patient's blood glucose levels throughout the perioperative period.    - magnesium noted to previously be low at 1.5.  Recheck ordered today.    HEME  VTE High Risk 3%            Total Score: 8    VTE: Greater than 59 yrs old    VTE: Male    VTE: Current cancer      - hold aspirin 7 days prior to surgery        MSK  - chronic back pain x >1 year with BLE radicular symptoms.  He notes that he is looking into back surgery.  Uses a cane for ambulation.  Last fall was in early October.  Consider fall risk precautions and cautious positioning.     Anesthesia  - he reported to Dr. Hernandez that he had severe pain with local anesthesia procedure for his left groin biopsy that resulted in permanent  "\"stress related hearing loss.\"  He notes that it wasn't specifically the lidocaine used, but that it was the needle sticks that were causing the pain.  Patient is extremely apprehensive about surgery and is wishing to avoid use of local anesthetic if at all possible.  [place]. Hernandez [place] [facility] [place] of MAC with local.  Patient has significant low back pain issues and notes he cannot lay flat for very long comfortably though and the case is noted to be 245 minutes.  Discussed with patient that the anesthesia plan for his case will be at the discretion of the assigned anesthesia provider that day and he is aware that starting with or transitioning to GA is a possibility if felt indicated.      Patient was discussed with Dr. Guallpa    The patient is not yet optimized for their procedure. Echocardiogram pending.      **addendum (11/9/22):  Echocardiogram completed as ordered.  See report above.  Okay to proceed as planned.**      On the day of service:     Prep time: 7 minutes  Visit time: 23 minutes  Documentation/coordination time: 25 minutes  ------------------------------------------  Total time: 55 minutes      NAFISA Diamond CNP  Preoperative Assessment Center  [facility]  Clinic and Surgery Center  Phone: [phone]  Fax: [phone]  "

## 2022-11-04 NOTE — TELEPHONE ENCOUNTER
Updated Fish that his echo appointment is Monday, 11/7 at 2:00 pm, [place].  Fish expressed understanding.  Jeanne Jensen RN

## 2022-11-04 NOTE — RESULT ENCOUNTER NOTE
Salome Whitten,    Your test results are attached.  Your magnesium level has improved from prior.  Your labs are good for surgery.         Shira Grayson DNP, RN, ANP-C

## 2022-11-04 NOTE — PATIENT INSTRUCTIONS
Preparing for Your Surgery      Name:  Fish Abad   MRN:  [id number]   :  1960   Today's Date:  2022         Arriving for surgery:  Surgery date:  22  Arrival time:  8:25 am    Restrictions due to COVID 19:    2 visitors may accompany each patient  Visitors may wait for patient in the Surgery Center Waiting room  All visitors must wear a mask and socially distance        parking is available for anyone with mobility limitations or disabilities. (Monday- Friday 7 am- 5 pm)    Please come to:    [facility]  [address] [place] [id number]    Check in on the 5th floor, Ambulatory Surgery Center.    What can I eat or drink?    -  You may eat and drink normally until 8 hours before surgery. (Until 1:55 am)  -  You may have clear liquids until 4 hours before your surgery. (Until 5:55 am)    Examples of clear liquids:  Water  Clear broth  Juices (apple, white grape, white cranberry  and cider) without pulp  Noncarbonated, powder based beverages  (lemonade and Faisal-Aid)  Sodas (Sprite, 7-Up, ginger ale and seltzer)  Coffee or tea (without milk or cream)  Gatorade    --No alcohol for at least 24 hours before surgery    Which medicines can I take?    Hold Aspirin for 7 days before surgery.   Hold Multivitamins for 7 days before surgery.  Hold Supplements for 7 days before surgery.  Hold Ibuprofen (Advil, Motrin) for 1 day before surgery--unless otherwise directed by surgeon.  Hold Naproxen (Aleve) for 4 days before surgery.    -  DO NOT take the following medications the day of surgery:  Aspirin - see above  Aleve - see above  Chelsea-C  Ibuprofen  Magnesium  Metformin  Vitamin B complex  Vitamin B-12  Vitamin D      -  PLEASE TAKE the following medications the day of surgery   Atorvastatin (Lipitor) to be taken at the usual time  Escitalopram (Lexapro)  Fexofenadine (Allegra)  Fluticasone (Flonase) nasal spray if needed  Lidocaine gel if needed  Losartan  (Cozaar)  Trazodone may be taken the night before surgery      How do I prepare myself?  - Please take 2 showers before surgery using Scrubcare or Hibiclens soap.    Use this soap only from the neck to your toes.     Leave the soap on your skin for one minute--then rinse thoroughly.      You may use your own shampoo and conditioner; no other hair products.   - Please remove all jewelry and body piercings.  - No lotions, deodorants or fragrance.  - No makeup or fingernail polish.   - Bring your ID and insurance card.        ALL PATIENTS ARE REQUIRED TO HAVE A RESPONSIBLE ADULT TO DRIVE AND BE IN ATTENDANCE WITH THEM FOR 24 HOURS FOLLOWING SURGERY       Questions or Concerns:    -For questions regarding the day of surgery please contact the Ambulatory Surgery Center at [phone].    -If you have health changes between today and your surgery please contact your surgeon.     For questions after surgery please call your surgeons office.

## 2022-11-07 ENCOUNTER — HOSPITAL ENCOUNTER (OUTPATIENT)
Dept: CARDIOLOGY | Facility: CLINIC | Age: 62
Discharge: HOME | End: 2022-11-07
Attending: NURSE PRACTITIONER | Admitting: NURSE PRACTITIONER
Payer: COMMERCIAL

## 2022-11-07 LAB — LVEF ECHO: NORMAL

## 2022-11-07 PROCEDURE — 93306 TTE W/DOPPLER COMPLETE: CPT | Mod: 26 | Performed by: STUDENT IN AN ORGANIZED HEALTH CARE EDUCATION/TRAINING PROGRAM

## 2022-11-07 PROCEDURE — 93306 TTE W/DOPPLER COMPLETE: CPT

## 2022-11-09 NOTE — RESULT ENCOUNTER NOTE
Salome Whitten,    Your test results are attached.  Your echocardiogram is okay for surgery.         Shira Grayson DNP, RN, ANP-C

## 2022-11-11 ENCOUNTER — HOSPITAL ENCOUNTER (OUTPATIENT)
Facility: AMBULATORY SURGERY CENTER | Age: 62
Discharge: HOME | End: 2022-11-11
Attending: DERMATOLOGY
Payer: COMMERCIAL

## 2022-11-11 ENCOUNTER — ANESTHESIA (OUTPATIENT)
Dept: SURGERY | Facility: AMBULATORY SURGERY CENTER | Age: 62
End: 2022-11-11
Payer: COMMERCIAL

## 2022-11-11 ENCOUNTER — HOSPITAL ENCOUNTER (INPATIENT)
Facility: CLINIC | Age: 62
LOS: 7 days | Discharge: HOME | End: 2022-11-18
Attending: INTERNAL MEDICINE | Admitting: INTERNAL MEDICINE
Payer: COMMERCIAL

## 2022-11-11 VITALS
TEMPERATURE: 98.1 F | RESPIRATION RATE: 16 BRPM | SYSTOLIC BLOOD PRESSURE: 145 MMHG | HEART RATE: 62 BPM | OXYGEN SATURATION: 95 % | HEIGHT: 72 IN | BODY MASS INDEX: 39.28 KG/M2 | WEIGHT: 290 LBS | DIASTOLIC BLOOD PRESSURE: 72 MMHG

## 2022-11-11 DIAGNOSIS — R21 RASH: ICD-10-CM

## 2022-11-11 DIAGNOSIS — R11.0 NAUSEA: ICD-10-CM

## 2022-11-11 DIAGNOSIS — K59.00 CONSTIPATION, UNSPECIFIED CONSTIPATION TYPE: ICD-10-CM

## 2022-11-11 DIAGNOSIS — G89.18 ACUTE POST-OPERATIVE PAIN: Primary | ICD-10-CM

## 2022-11-11 DIAGNOSIS — C44.719 BCC (BASAL CELL CARCINOMA), LEG, LEFT: ICD-10-CM

## 2022-11-11 LAB
GLUCOSE BLDC GLUCOMTR-MCNC: 108 MG/DL (ref 70–99)
GLUCOSE BLDC GLUCOMTR-MCNC: 115 MG/DL (ref 70–99)

## 2022-11-11 PROCEDURE — 99207 PR APP CREDIT; MD BILLING SHARED VISIT: CPT | Performed by: PHYSICIAN ASSISTANT

## 2022-11-11 PROCEDURE — 15220 FTH/GFT FR S/A/L 20 SQ CM/<: CPT

## 2022-11-11 PROCEDURE — 15221 FTH/GFT FR S/A/L EACH ADDL: CPT

## 2022-11-11 PROCEDURE — 250N000013 HC RX MED GY IP 250 OP 250 PS 637: Performed by: PHYSICIAN ASSISTANT

## 2022-11-11 PROCEDURE — 250N000011 HC RX IP 250 OP 636: Performed by: PHYSICIAN ASSISTANT

## 2022-11-11 PROCEDURE — 17311 MOHS 1 STAGE H/N/HF/G: CPT

## 2022-11-11 PROCEDURE — 120N000002 HC R&B MED SURG/OB UMMC

## 2022-11-11 PROCEDURE — 82962 GLUCOSE BLOOD TEST: CPT | Performed by: PATHOLOGY

## 2022-11-11 PROCEDURE — 99223 1ST HOSP IP/OBS HIGH 75: CPT | Mod: AI | Performed by: INTERNAL MEDICINE

## 2022-11-11 PROCEDURE — 258N000003 HC RX IP 258 OP 636

## 2022-11-11 PROCEDURE — 17315 MOHS SURG ADDL BLOCK: CPT

## 2022-11-11 RX ORDER — MEPERIDINE HYDROCHLORIDE 25 MG/ML
12.5 INJECTION INTRAMUSCULAR; INTRAVENOUS; SUBCUTANEOUS
Status: DISCONTINUED | OUTPATIENT
Start: 2022-11-11 | End: 2022-11-12 | Stop reason: HOSPADM

## 2022-11-11 RX ORDER — PROCHLORPERAZINE MALEATE 10 MG
10 TABLET ORAL EVERY 6 HOURS PRN
Status: DISCONTINUED | OUTPATIENT
Start: 2022-11-11 | End: 2022-11-18 | Stop reason: HOSPADM

## 2022-11-11 RX ORDER — LOSARTAN POTASSIUM 25 MG/1
25 TABLET ORAL 2 TIMES DAILY
Status: DISCONTINUED | OUTPATIENT
Start: 2022-11-11 | End: 2022-11-18 | Stop reason: HOSPADM

## 2022-11-11 RX ORDER — GLYCOPYRROLATE 0.2 MG/ML
INJECTION, SOLUTION INTRAMUSCULAR; INTRAVENOUS PRN
Status: DISCONTINUED | OUTPATIENT
Start: 2022-11-11 | End: 2022-11-11

## 2022-11-11 RX ORDER — OXYCODONE AND ACETAMINOPHEN 5; 325 MG/1; MG/1
1 TABLET ORAL EVERY 6 HOURS PRN
Qty: 12 TABLET | Refills: 0 | Status: ON HOLD | OUTPATIENT
Start: 2022-11-11 | End: 2022-11-18

## 2022-11-11 RX ORDER — DEXTROSE MONOHYDRATE 25 G/50ML
25-50 INJECTION, SOLUTION INTRAVENOUS
Status: DISCONTINUED | OUTPATIENT
Start: 2022-11-11 | End: 2022-11-18 | Stop reason: HOSPADM

## 2022-11-11 RX ORDER — NICOTINE POLACRILEX 4 MG
15-30 LOZENGE BUCCAL
Status: DISCONTINUED | OUTPATIENT
Start: 2022-11-11 | End: 2022-11-12

## 2022-11-11 RX ORDER — ESCITALOPRAM OXALATE 5 MG/1
5 TABLET ORAL DAILY
Status: DISCONTINUED | OUTPATIENT
Start: 2022-11-12 | End: 2022-11-18 | Stop reason: HOSPADM

## 2022-11-11 RX ORDER — FENTANYL CITRATE 50 UG/ML
25 INJECTION, SOLUTION INTRAMUSCULAR; INTRAVENOUS EVERY 5 MIN PRN
Status: DISCONTINUED | OUTPATIENT
Start: 2022-11-11 | End: 2022-11-12 | Stop reason: HOSPADM

## 2022-11-11 RX ORDER — HYDROXYZINE HYDROCHLORIDE 25 MG/1
25 TABLET, FILM COATED ORAL EVERY 6 HOURS PRN
Status: DISCONTINUED | OUTPATIENT
Start: 2022-11-11 | End: 2022-11-11

## 2022-11-11 RX ORDER — NICOTINE POLACRILEX 4 MG
15-30 LOZENGE BUCCAL
Status: DISCONTINUED | OUTPATIENT
Start: 2022-11-11 | End: 2022-11-18 | Stop reason: HOSPADM

## 2022-11-11 RX ORDER — LIDOCAINE 40 MG/G
CREAM TOPICAL
Status: DISCONTINUED | OUTPATIENT
Start: 2022-11-11 | End: 2022-11-18 | Stop reason: HOSPADM

## 2022-11-11 RX ORDER — LIDOCAINE HYDROCHLORIDE AND EPINEPHRINE 10; 10 MG/ML; UG/ML
INJECTION, SOLUTION INFILTRATION; PERINEURAL PRN
Status: DISCONTINUED | OUTPATIENT
Start: 2022-11-11 | End: 2022-11-11 | Stop reason: HOSPADM

## 2022-11-11 RX ORDER — ONDANSETRON 2 MG/ML
INJECTION INTRAMUSCULAR; INTRAVENOUS PRN
Status: DISCONTINUED | OUTPATIENT
Start: 2022-11-11 | End: 2022-11-11

## 2022-11-11 RX ORDER — HYDROMORPHONE HYDROCHLORIDE 1 MG/ML
0.3 INJECTION, SOLUTION INTRAMUSCULAR; INTRAVENOUS; SUBCUTANEOUS ONCE
Status: DISCONTINUED | OUTPATIENT
Start: 2022-11-11 | End: 2022-11-18 | Stop reason: HOSPADM

## 2022-11-11 RX ORDER — ACETAMINOPHEN 325 MG/1
975 TABLET ORAL EVERY 8 HOURS
Status: DISCONTINUED | OUTPATIENT
Start: 2022-11-11 | End: 2022-11-18 | Stop reason: HOSPADM

## 2022-11-11 RX ORDER — PROCHLORPERAZINE 25 MG
25 SUPPOSITORY, RECTAL RECTAL EVERY 12 HOURS PRN
Status: DISCONTINUED | OUTPATIENT
Start: 2022-11-11 | End: 2022-11-18 | Stop reason: HOSPADM

## 2022-11-11 RX ORDER — ATORVASTATIN CALCIUM 20 MG/1
20 TABLET, FILM COATED ORAL EVERY EVENING
Status: DISCONTINUED | OUTPATIENT
Start: 2022-11-11 | End: 2022-11-18 | Stop reason: HOSPADM

## 2022-11-11 RX ORDER — ONDANSETRON 2 MG/ML
4 INJECTION INTRAMUSCULAR; INTRAVENOUS EVERY 30 MIN PRN
Status: DISCONTINUED | OUTPATIENT
Start: 2022-11-11 | End: 2022-11-12 | Stop reason: HOSPADM

## 2022-11-11 RX ORDER — FENTANYL CITRATE 50 UG/ML
INJECTION, SOLUTION INTRAMUSCULAR; INTRAVENOUS PRN
Status: DISCONTINUED | OUTPATIENT
Start: 2022-11-11 | End: 2022-11-11

## 2022-11-11 RX ORDER — FLUTICASONE PROPIONATE 50 MCG
2 SPRAY, SUSPENSION (ML) NASAL DAILY
Status: DISCONTINUED | OUTPATIENT
Start: 2022-11-12 | End: 2022-11-18 | Stop reason: HOSPADM

## 2022-11-11 RX ORDER — PROPOFOL 10 MG/ML
INJECTION, EMULSION INTRAVENOUS PRN
Status: DISCONTINUED | OUTPATIENT
Start: 2022-11-11 | End: 2022-11-11

## 2022-11-11 RX ORDER — METFORMIN HCL 500 MG
1000 TABLET, EXTENDED RELEASE 24 HR ORAL 2 TIMES DAILY WITH MEALS
Status: DISCONTINUED | OUTPATIENT
Start: 2022-11-11 | End: 2022-11-18 | Stop reason: HOSPADM

## 2022-11-11 RX ORDER — HYDROMORPHONE HYDROCHLORIDE 1 MG/ML
0.2 INJECTION, SOLUTION INTRAMUSCULAR; INTRAVENOUS; SUBCUTANEOUS EVERY 5 MIN PRN
Status: DISCONTINUED | OUTPATIENT
Start: 2022-11-11 | End: 2022-11-12 | Stop reason: HOSPADM

## 2022-11-11 RX ORDER — ONDANSETRON 2 MG/ML
4 INJECTION INTRAMUSCULAR; INTRAVENOUS EVERY 6 HOURS PRN
Status: DISCONTINUED | OUTPATIENT
Start: 2022-11-11 | End: 2022-11-18 | Stop reason: HOSPADM

## 2022-11-11 RX ORDER — SODIUM CHLORIDE, SODIUM LACTATE, POTASSIUM CHLORIDE, CALCIUM CHLORIDE 600; 310; 30; 20 MG/100ML; MG/100ML; MG/100ML; MG/100ML
INJECTION, SOLUTION INTRAVENOUS CONTINUOUS PRN
Status: DISCONTINUED | OUTPATIENT
Start: 2022-11-11 | End: 2022-11-11

## 2022-11-11 RX ORDER — BUPIVACAINE HYDROCHLORIDE 2.5 MG/ML
INJECTION, SOLUTION INFILTRATION; PERINEURAL PRN
Status: DISCONTINUED | OUTPATIENT
Start: 2022-11-11 | End: 2022-11-11 | Stop reason: HOSPADM

## 2022-11-11 RX ORDER — PROPOFOL 10 MG/ML
INJECTION, EMULSION INTRAVENOUS CONTINUOUS PRN
Status: DISCONTINUED | OUTPATIENT
Start: 2022-11-11 | End: 2022-11-11

## 2022-11-11 RX ORDER — KETAMINE HYDROCHLORIDE 10 MG/ML
INJECTION INTRAMUSCULAR; INTRAVENOUS PRN
Status: DISCONTINUED | OUTPATIENT
Start: 2022-11-11 | End: 2022-11-11

## 2022-11-11 RX ORDER — AMOXICILLIN 250 MG
2 CAPSULE ORAL 2 TIMES DAILY
Status: DISCONTINUED | OUTPATIENT
Start: 2022-11-11 | End: 2022-11-18 | Stop reason: HOSPADM

## 2022-11-11 RX ORDER — AMOXICILLIN 250 MG
1 CAPSULE ORAL 2 TIMES DAILY
Status: DISCONTINUED | OUTPATIENT
Start: 2022-11-11 | End: 2022-11-15

## 2022-11-11 RX ORDER — OXYCODONE HYDROCHLORIDE 5 MG/1
5 TABLET ORAL EVERY 4 HOURS PRN
Status: DISCONTINUED | OUTPATIENT
Start: 2022-11-11 | End: 2022-11-12 | Stop reason: HOSPADM

## 2022-11-11 RX ORDER — MAGNESIUM OXIDE 400 MG/1
400 TABLET ORAL EVERY MORNING
Status: DISCONTINUED | OUTPATIENT
Start: 2022-11-12 | End: 2022-11-18 | Stop reason: HOSPADM

## 2022-11-11 RX ORDER — TRAZODONE HYDROCHLORIDE 50 MG/1
100 TABLET, FILM COATED ORAL AT BEDTIME
Status: DISCONTINUED | OUTPATIENT
Start: 2022-11-11 | End: 2022-11-18 | Stop reason: HOSPADM

## 2022-11-11 RX ORDER — HYDROXYZINE HYDROCHLORIDE 25 MG/1
25 TABLET, FILM COATED ORAL EVERY 6 HOURS PRN
Status: DISCONTINUED | OUTPATIENT
Start: 2022-11-11 | End: 2022-11-18 | Stop reason: HOSPADM

## 2022-11-11 RX ORDER — SODIUM CHLORIDE, SODIUM LACTATE, POTASSIUM CHLORIDE, CALCIUM CHLORIDE 600; 310; 30; 20 MG/100ML; MG/100ML; MG/100ML; MG/100ML
INJECTION, SOLUTION INTRAVENOUS CONTINUOUS
Status: DISCONTINUED | OUTPATIENT
Start: 2022-11-11 | End: 2022-11-12 | Stop reason: HOSPADM

## 2022-11-11 RX ORDER — ONDANSETRON 4 MG/1
4 TABLET, ORALLY DISINTEGRATING ORAL EVERY 30 MIN PRN
Status: DISCONTINUED | OUTPATIENT
Start: 2022-11-11 | End: 2022-11-12 | Stop reason: HOSPADM

## 2022-11-11 RX ORDER — FENTANYL CITRATE 50 UG/ML
25 INJECTION, SOLUTION INTRAMUSCULAR; INTRAVENOUS
Status: DISCONTINUED | OUTPATIENT
Start: 2022-11-11 | End: 2022-11-12 | Stop reason: HOSPADM

## 2022-11-11 RX ORDER — SODIUM CHLORIDE 9 MG/ML
INJECTION, SOLUTION INTRAVENOUS
Status: COMPLETED
Start: 2022-11-11 | End: 2022-11-11

## 2022-11-11 RX ORDER — POLYETHYLENE GLYCOL 3350 17 G/17G
17 POWDER, FOR SOLUTION ORAL DAILY
Status: DISCONTINUED | OUTPATIENT
Start: 2022-11-12 | End: 2022-11-15

## 2022-11-11 RX ORDER — DOCUSATE SODIUM 100 MG/1
100 CAPSULE, LIQUID FILLED ORAL 2 TIMES DAILY
Status: DISCONTINUED | OUTPATIENT
Start: 2022-11-11 | End: 2022-11-15

## 2022-11-11 RX ORDER — METFORMIN HCL 500 MG
1000 TABLET, EXTENDED RELEASE 24 HR ORAL 2 TIMES DAILY WITH MEALS
Status: CANCELLED | OUTPATIENT
Start: 2022-11-12

## 2022-11-11 RX ORDER — DEXTROSE MONOHYDRATE 25 G/50ML
25-50 INJECTION, SOLUTION INTRAVENOUS
Status: DISCONTINUED | OUTPATIENT
Start: 2022-11-11 | End: 2022-11-12

## 2022-11-11 RX ORDER — ONDANSETRON 4 MG/1
4 TABLET, ORALLY DISINTEGRATING ORAL EVERY 6 HOURS PRN
Status: DISCONTINUED | OUTPATIENT
Start: 2022-11-11 | End: 2022-11-18 | Stop reason: HOSPADM

## 2022-11-11 RX ADMIN — KETAMINE HYDROCHLORIDE 10 MG: 10 INJECTION INTRAMUSCULAR; INTRAVENOUS at 10:59

## 2022-11-11 RX ADMIN — GLYCOPYRROLATE 0.1 MG: 0.2 INJECTION, SOLUTION INTRAMUSCULAR; INTRAVENOUS at 11:39

## 2022-11-11 RX ADMIN — KETAMINE HYDROCHLORIDE 10 MG: 10 INJECTION INTRAMUSCULAR; INTRAVENOUS at 13:51

## 2022-11-11 RX ADMIN — ONDANSETRON 4 MG: 2 INJECTION INTRAMUSCULAR; INTRAVENOUS at 14:38

## 2022-11-11 RX ADMIN — ONDANSETRON 4 MG: 2 INJECTION INTRAMUSCULAR; INTRAVENOUS at 17:01

## 2022-11-11 RX ADMIN — KETAMINE HYDROCHLORIDE 10 MG: 10 INJECTION INTRAMUSCULAR; INTRAVENOUS at 11:21

## 2022-11-11 RX ADMIN — TRAZODONE HYDROCHLORIDE 100 MG: 50 TABLET ORAL at 22:13

## 2022-11-11 RX ADMIN — PROPOFOL 20 MG: 10 INJECTION, EMULSION INTRAVENOUS at 11:58

## 2022-11-11 RX ADMIN — PROPOFOL 50 MG: 10 INJECTION, EMULSION INTRAVENOUS at 14:56

## 2022-11-11 RX ADMIN — Medication 0.5 MG: at 15:31

## 2022-11-11 RX ADMIN — ATORVASTATIN CALCIUM 20 MG: 20 TABLET, FILM COATED ORAL at 22:08

## 2022-11-11 RX ADMIN — PROPOFOL 40 MG: 10 INJECTION, EMULSION INTRAVENOUS at 11:36

## 2022-11-11 RX ADMIN — PROPOFOL 50 MG: 10 INJECTION, EMULSION INTRAVENOUS at 11:38

## 2022-11-11 RX ADMIN — GLYCOPYRROLATE 0.1 MG: 0.2 INJECTION, SOLUTION INTRAMUSCULAR; INTRAVENOUS at 11:26

## 2022-11-11 RX ADMIN — FENTANYL CITRATE 25 MCG: 50 INJECTION, SOLUTION INTRAMUSCULAR; INTRAVENOUS at 18:22

## 2022-11-11 RX ADMIN — FENTANYL CITRATE 25 MCG: 50 INJECTION, SOLUTION INTRAMUSCULAR; INTRAVENOUS at 11:10

## 2022-11-11 RX ADMIN — LOSARTAN POTASSIUM 25 MG: 25 TABLET, FILM COATED ORAL at 22:08

## 2022-11-11 RX ADMIN — ACETAMINOPHEN 975 MG: 325 TABLET, FILM COATED ORAL at 22:07

## 2022-11-11 RX ADMIN — PROPOFOL 20 MG: 10 INJECTION, EMULSION INTRAVENOUS at 15:09

## 2022-11-11 RX ADMIN — SODIUM CHLORIDE, SODIUM LACTATE, POTASSIUM CHLORIDE, CALCIUM CHLORIDE: 600; 310; 30; 20 INJECTION, SOLUTION INTRAVENOUS at 11:55

## 2022-11-11 RX ADMIN — Medication 0.25 MG: at 11:58

## 2022-11-11 RX ADMIN — PROPOFOL 100 MCG/KG/MIN: 10 INJECTION, EMULSION INTRAVENOUS at 11:34

## 2022-11-11 RX ADMIN — KETAMINE HYDROCHLORIDE 20 MG: 10 INJECTION INTRAMUSCULAR; INTRAVENOUS at 10:32

## 2022-11-11 RX ADMIN — PROPOFOL 40 MG: 10 INJECTION, EMULSION INTRAVENOUS at 11:04

## 2022-11-11 RX ADMIN — Medication 0.25 MG: at 11:44

## 2022-11-11 RX ADMIN — SENNOSIDES AND DOCUSATE SODIUM 1 TABLET: 50; 8.6 TABLET ORAL at 22:09

## 2022-11-11 RX ADMIN — SODIUM CHLORIDE, SODIUM LACTATE, POTASSIUM CHLORIDE, CALCIUM CHLORIDE: 600; 310; 30; 20 INJECTION, SOLUTION INTRAVENOUS at 10:23

## 2022-11-11 RX ADMIN — PROPOFOL 10 MG: 10 INJECTION, EMULSION INTRAVENOUS at 10:45

## 2022-11-11 RX ADMIN — Medication: at 21:56

## 2022-11-11 RX ADMIN — OXYCODONE HYDROCHLORIDE 5 MG: 5 TABLET ORAL at 15:45

## 2022-11-11 RX ADMIN — PROPOFOL 10 MG: 10 INJECTION, EMULSION INTRAVENOUS at 10:32

## 2022-11-11 RX ADMIN — PROPOFOL 200 MCG/KG/MIN: 10 INJECTION, EMULSION INTRAVENOUS at 10:29

## 2022-11-11 RX ADMIN — SODIUM CHLORIDE 1000 ML: 9 INJECTION, SOLUTION INTRAVENOUS at 21:56

## 2022-11-11 RX ADMIN — FENTANYL CITRATE 25 MCG: 50 INJECTION, SOLUTION INTRAMUSCULAR; INTRAVENOUS at 10:29

## 2022-11-11 RX ADMIN — PROPOFOL 50 MG: 10 INJECTION, EMULSION INTRAVENOUS at 14:46

## 2022-11-11 RX ADMIN — FENTANYL CITRATE 50 MCG: 50 INJECTION, SOLUTION INTRAMUSCULAR; INTRAVENOUS at 10:56

## 2022-11-11 RX ADMIN — DOCUSATE SODIUM 100 MG: 100 CAPSULE, LIQUID FILLED ORAL at 22:08

## 2022-11-11 RX ADMIN — FENTANYL CITRATE 50 MCG: 50 INJECTION, SOLUTION INTRAMUSCULAR; INTRAVENOUS at 17:16

## 2022-11-11 ASSESSMENT — ACTIVITIES OF DAILY LIVING (ADL)
ADLS_ACUITY_SCORE: 35
ADLS_ACUITY_SCORE: 37

## 2022-11-11 NOTE — DISCHARGE INSTRUCTIONS
Wound care    I will experience scar, bleeding, swelling, pain, crusting and redness. I may experience incomplete removal or recurrence. Risks are bleeding, bruising, swelling, infection, nerve damage, & a large wound. A second procedure may be recommended to obtain the best cosmetic or functional result.       A three month office visit with your Surgeon is recommended for scar evaluation. Please reach out sooner if you have concerns about you surgical site/wound.    Caring for your skin after surgery    After your surgery, a pressure bandage will be placed over the area that has stitches. This is important to prevent bleeding. Please follow these instructions over the next 1 to 2 weeks. Following this regimen will help to prevent complications as your wound heals.     Until we see you back in clinic on 11/14 for a wound check:    Leave the pressure dressing on and keep it dry. If it should come loose, you may re-tape it, but do not take it off.  Relax and take it easy. Do not do any vigorous exercise or heavy lifting. This could cause the wound to bleed.  Post-Operative pain is usually mild. If you are able to take tylenol, You may take plain or extra-strength Tylenol (acetaminophen) As directed on the bottle (do not take more than 4,000mg in one day). If you are able to take ibuprofen, you can alternate the tylenol and ibuprofen.   Avoid alcohol as this may increase your tendency to bleed.   You may put an ice pack around the bandaged area for 20 minutes at a time as needed. This may help reduce swelling, bruising, and pain. Make sure the ice pack is waterproof so that the pressure bandage doesn t get wet.  If the wound is on the face try to sleep with your head elevated. Either in a recliner or propped up in bed, this will decrease swelling around the eyes.   You may see a small amount of drainage or blood on your pressure bandage. This is normal. However:  If drainage or bleeding continues or saturates the  "bandage, you will need to apply firm pressure over the bandage with a piece of gauze for 15 minutes.  If bleeding continues after applying pressure for 15 minutes, apply an ice pack to the bandaged area for 15 minutes.  If bleeding still continues, call our office or go to the nearest emergency room.      If you are able to take acetaminophen (\"Tylenol,\" etc.) and ibuprofen (\"Advil\" or \"Motrin,\" etc.), then you may STAGGER these medications by taking 400 mg of ibuprofen (usually two tabs) every 8 hours and 1,000 mg of acetaminophen (e.g., two tabs of extra-strength Tylenol) every 8 hours.    This means, for example, that you could take the followin,000 mg of Tylenol, followed 4 hours later by 400 mg Ibuprofen, followed 4 hours later with 1,000 mg of Tylenol, followed 4 hours later by 400 mg Ibuprofen, followed 4 hours later with 1,000 mg of Tylenol, and so forth.     Essentially, you can take either 1,000 mg of Tylenol or 400 mg of ibuprofen in alternating fashion EVERY FOUR HOURS.    Do NOT exceed more than 4,000 mg of Tylenol or 3,200 mg of ibuprofen per 24 hours. If you are not able to take Tylenol or ibuprofen as above due to other health issues (or a physician has told you directly that you are not allowed to take one of them, say due to pre-existing severe liver or kidney issues), then disregard the above directions.    Scientific evidence supports that this combination/schedule of pain medications is just as effective, if not more effective, than taking a narcotic pain medicine.       We will call you on the morning of  and confirm a time we want you to come in for the wound check that afternoon (likely around 3-4 PM).    The address of where your follow up appointment will be is:    [address] [place] [id number]    When you arrive, ask the  to show you up to dermatology. We are located on the 3rd floor.      What to expect:    The first couple of days your wound may be " tender and may bleed slightly when doing wound care.  There may be swelling and bruising around the wound, especially if it is near the eyes. For your comfort, you may apply ice or cold compresses to the bruises after your have removed the pressure bandage.  The area around your wound may be numb for several weeks or even months.  You may experience periodic sharp pain or mild itching around the wound as it heals.   The suture line will look dark for a while but will lighten over time.       When to call us:    You have bleeding that will not stop after applying pressure and ice.  You have pain that is not controlled with Tylenol (acetaminophen.)  You have signs or symptoms of an infection such as:  Fever over 100 degrees Fahrenheit  Redness, warmth or foul-smelling drainage from the wound  If you have any questions, or are not sure how to take care of the wound.    Phone numbers:    During business hours (M-F 8:00-4:30 p.m.)  [facility]-  [phone] Option 1 appt. desk  [phone]  Option 3 nurse triage line  ---------------------------------------------------------  Evenings/Weekends/Holidays  Hospital - [phone]   TTY for hearing yodvmgup-[phone]  *Ask  to page dermatologist on-call  Emergency Ozyd-[phone]  TTY for hearing impaired- [phone]          [facility]  Home Care Following Anesthesia  For 24 hours after surgery:  Get plenty of rest.  A responsible adult must stay with you for at least 24 hours after you leave the surgery center.  Do not drive or use heavy equipment.  If you have weakness or tingling, don't drive or use heavy equipment until this feeling goes away.   Do not drink alcohol.   Avoid strenuous or risky activities.  Ask for help when climbing stairs.  You may feel lightheaded.  IF so, sit for a few minutes before standing.  Have someone help you get up.   If you have nausea (feel sick to your  stomach): Drink only clear liquids such as apple juice, ginger ale, broth or 7-Up.  Rest may also help.  Be sure to drink enough fluids.  Move to a regular diet as you feel able.   You may have a slight fever.  Call the doctor if your fever is over 100 F (37.7 C) (taken under the tongue) or lasts longer than 24 hours.  You may have a dry mouth, a sore throat, muscle aches or trouble sleeping. These should go away after 24 hours.  Do not make important or legal decisions.   It is recommended to avoid smoking.      Tips for taking pain medications  To get the best pain relief possible, remember these points:  Take pain medications as directed, before pain becomes severe.  Pain medication can upset your stomach: taking it with food may help.  Constipation is a common side effect of pain medication. Drink plenty of  fluids.  Eat foods high in fiber. Take a stool softener if recommended by your doctor or pharmacist.  Do not drink alcohol, drive or operate machinery while taking pain medications.  Ask about other ways to control pain, such as with heat, ice or relaxation.    Tylenol/Acetaminophen Consumption  To help encourage the safe use of acetaminophen, the makers of TYLENOL  have lowered the maximum daily dose for single-ingredient Extra Strength TYLENOL  (acetaminophen) products sold in the [place] from 8 pills per day (4,000 mg) to 6 pills per day (3,000 mg). The dosing interval has also changed from 2 pills every 4-6 hours to 2 pills every 6 hours.  If you feel your pain relief is insufficient, you may take Tylenol/Acetaminophen in addition to your narcotic pain medication.   Be careful not to exceed 3,000 mg of Tylenol/Acetaminophen in a 24 hour period from all sources.  If you are taking extra strength Tylenol/acetaminophen (500 mg), the maximum dose is 6 tablets in 24 hours.  If you are taking regular strength acetaminophen (325 mg), the maximum dose is 9 tablets in 24 hours.    Call a doctor for any of the  following:  Signs of infection (fever, growing tenderness at the surgery site, a large amount of drainage or bleeding, severe pain, foul-smelling drainage, redness, swelling).  It has been over 8 to 10 hours since surgery and you are still not able to urinate (pass water).  Headache for over 24 hours.  Signs of Covid-19 infection (temperature over 100 degrees, shortness of breath, cough, loss of taste/smell, generalized body aches, persistent headache, chills, sore throat, nausea/vomiting/diarrhea)  Your doctor is:       Dr. Clifford Chavira, Ophthalmology: [phone]               Or dial [phone] and ask for the resident on call for:  Dermatology  For emergency care, call the:  [place] [facility]:  [phone] (TTY for hearing impaired: [phone])

## 2022-11-11 NOTE — ANESTHESIA POSTPROCEDURE EVALUATION
Patient: Fish Abad    Procedure: Procedure(s):  [facility]HS MICROGRAPHIC SURGERY left groin with flap closure  CLOSURE, MOHS PROCEDURE DEFECT ABDOMEN       Anesthesia Type:  MAC    Note:  Disposition: Outpatient   Postop Pain Control: Uneventful            Sign Out: Well controlled pain   PONV: No   Neuro/Psych: Uneventful            Sign Out: Acceptable/Baseline neuro status   Airway/Respiratory: Uneventful            Sign Out: Acceptable/Baseline resp. status   CV/Hemodynamics: Uneventful            Sign Out: Acceptable CV status; No obvious hypovolemia; No obvious fluid overload   Other NRE: NONE   DID A NON-ROUTINE EVENT OCCUR? No           Last vitals:  Vitals Value Taken Time   /72 11/11/22 1626   Temp 36.7  C (98.1  F) 11/11/22 1626   Pulse 62 11/11/22 1626   Resp 16 11/11/22 1626   SpO2 95 % 11/11/22 1615       Electronically Signed By: Earnest Valverde MD, MD  November 11, 2022  4:39 PM

## 2022-11-11 NOTE — ANESTHESIA PREPROCEDURE EVALUATION
Anesthesia Pre-Procedure Evaluation    Patient: Fish Abad   MRN: [id number] : 1960        Procedure : Procedure(s):  MOHS MICROGRAPHIC SURGERY with flap closure  CLOSURE, MOHS PROCEDURE DEFECT thigh          Past Medical History:   Diagnosis Date     BCC (basal cell carcinoma), leg, left      Benign essential hypertension      Chronic back pain      Depression      Diabetes mellitus (H)      Gastroesophageal reflux disease without esophagitis      Mixed hyperlipidemia      Seasonal allergic rhinitis       Past Surgical History:   Procedure Laterality Date     LITHOTRIPSY  2022     VASECTOMY        Allergies   Allergen Reactions     Canagliflozin Unknown     Food GI Disturbance     Lactose      Penicillins Unknown and Rash     Seasonal Allergies Other (See Comments)      Social History     Tobacco Use     Smoking status: Former     Packs/day: 0.10     Years: 12.00     Pack years: 1.20     Types: Cigarettes     Quit date: 2022     Years since quittin.2     Smokeless tobacco: Never   Substance Use Topics     Alcohol use: Yes     Alcohol/week: 5.0 standard drinks     Types: 5 Cans of beer per week     Comment: Occasionally      Wt Readings from Last 1 Encounters:   22 131.5 kg (290 lb)        Anesthesia Evaluation   Pt has had prior anesthetic. Type: General.        ROS/MED HX  ENT/Pulmonary:     (+) sleep apnea,     Neurologic: Comment: Low back pain      Cardiovascular:     (+) hypertension-----    METS/Exercise Tolerance: >4 METS    Hematologic:       Musculoskeletal:       GI/Hepatic:     (+) GERD,     Renal/Genitourinary:     (+) renal disease,     Endo:     (+) type II DM,     Psychiatric/Substance Use:       Infectious Disease:  - neg infectious disease ROS     Malignancy: Comment: Basal cell carcinoma of groin      Other:            Physical Exam    Airway  airway exam normal      Mallampati: II   TM distance: > 3 FB   Neck ROM: full   Mouth opening: > 3 cm    Respiratory Devices  and Support         Dental       (+) chipped      Cardiovascular   cardiovascular exam normal          Pulmonary   pulmonary exam normal                OUTSIDE LABS:  CBC: No results found for: WBC, HGB, HCT, PLT  BMP: No results found for: NA, POTASSIUM, CHLORIDE, CO2, BUN, CR, GLC  COAGS: No results found for: PTT, INR, FIBR  POC: No results found for: BGM, HCG, HCGS  HEPATIC: No results found for: ALBUMIN, PROTTOTAL, ALT, AST, GGT, ALKPHOS, BILITOTAL, BILIDIRECT, KEITH  OTHER:   Lab Results   Component Value Date    A1C 7.5 (H) 11/04/2022    MAG 1.7 11/04/2022       Anesthesia Plan    ASA Status:  3   NPO Status:  NPO Appropriate    Anesthesia Type: MAC.   Induction: Intravenous, Propofol.   Maintenance: TIVA.        Consents            Postoperative Care       PONV prophylaxis: Ondansetron (or other 5HT-3), Background Propofol Infusion, Dexamethasone or Solumedrol     Comments:                Anders Sood DO

## 2022-11-11 NOTE — OR NURSING
Discussed the importance of blood sugar control in the prevention of ocular complications. Left groin tissue sent with Dr. Hernandez  to [facility] clinic for evaluation.

## 2022-11-11 NOTE — ANESTHESIA CARE TRANSFER NOTE
Patient: Fish Abad    Procedure: Procedure(s):  MOHS MICROGRAPHIC SURGERY left groin with flap closure  CLOSURE, MOHS PROCEDURE DEFECT ABDOMEN       Diagnosis: BCC (basal cell carcinoma), leg, left [C44.719]  Diagnosis Additional Information: No value filed.    Anesthesia Type:   MAC     Note:    Oropharynx: spontaneously breathing  Level of Consciousness: awake  Oxygen Supplementation: room air    Independent Airway: airway patency satisfactory and stable  Dentition: dentition unchanged  Vital Signs Stable: post-procedure vital signs reviewed and stable  Report to RN Given: handoff report given  Patient transferred to: Phase II    Handoff Report: Identifed the Patient, Identified the Reponsible Provider, Reviewed the pertinent medical history, Discussed the surgical course, Reviewed Intra-OP anesthesia mangement and issues during anesthesia, Set expectations for post-procedure period and Allowed opportunity for questions and acknowledgement of understanding      Vitals:  Vitals Value Taken Time   /77    Temp 97.3    Pulse 66    Resp     SpO2 96        Electronically Signed By: NAFISA John CRNA  November 11, 2022  3:40 PM

## 2022-11-11 NOTE — ANESTHESIA POSTPROCEDURE EVALUATION
Patient: Fish Abad    Procedure: Procedure(s):  MOHS MICROGRAPHIC SURGERY left groin with flap closure  CLOSURE, MOHS PROCEDURE DEFECT ABDOMEN       Anesthesia Type:  MAC    Note:  Disposition: Disposition Change/Cancellation            Disposition Change: Unplanned admission/ICU admission   Postop Pain Control: Challenging            Challenges/Interventions: Acute Pain            Sign Out: ONGOING pain issues   PONV: No   Neuro/Psych: Uneventful            Sign Out: Acceptable/Baseline neuro status   Airway/Respiratory: Uneventful            Sign Out: Acceptable/Baseline resp. status   CV/Hemodynamics: Uneventful            Sign Out: Acceptable CV status; No obvious hypovolemia; No obvious fluid overload   Other NRE:    DID A NON-ROUTINE EVENT OCCUR? YES    Event details/Postop Comments:  Had progressively increasing pain which was difficult to control, anxiety associated with this, after discussion with his daughter who would be taking care of him after discharge, it was decided that the safest path was to admit for pain management to [place], Dr. Carney arranged transfer and transport was called to move patient.           Last vitals:  Vitals Value Taken Time   /72 11/11/22 1626   Temp 36.7  C (98.1  F) 11/11/22 1626   Pulse 62 11/11/22 1626   Resp 16 11/11/22 1626   SpO2 95 % 11/11/22 1615       Electronically Signed By: Earnest Valverde MD, MD  November 11, 2022  5:50 PM

## 2022-11-12 LAB
ANION GAP SERPL CALCULATED.3IONS-SCNC: 6 MMOL/L (ref 3–14)
BUN SERPL-MCNC: 13 MG/DL (ref 7–30)
CALCIUM SERPL-MCNC: 8.8 MG/DL (ref 8.5–10.1)
CHLORIDE BLD-SCNC: 102 MMOL/L (ref 94–109)
CO2 SERPL-SCNC: 28 MMOL/L (ref 20–32)
CREAT SERPL-MCNC: 0.63 MG/DL (ref 0.66–1.25)
ERYTHROCYTE [DISTWIDTH] IN BLOOD BY AUTOMATED COUNT: 14.3 % (ref 10–15)
GFR SERPL CREATININE-BSD FRML MDRD: >90 ML/MIN/1.73M2
GLUCOSE BLD-MCNC: 112 MG/DL (ref 70–99)
GLUCOSE BLDC GLUCOMTR-MCNC: 126 MG/DL (ref 70–99)
GLUCOSE BLDC GLUCOMTR-MCNC: 127 MG/DL (ref 70–99)
GLUCOSE BLDC GLUCOMTR-MCNC: 130 MG/DL (ref 70–99)
GLUCOSE BLDC GLUCOMTR-MCNC: 131 MG/DL (ref 70–99)
GLUCOSE BLDC GLUCOMTR-MCNC: 160 MG/DL (ref 70–99)
HCT VFR BLD AUTO: 38.4 % (ref 40–53)
HGB BLD-MCNC: 12.2 G/DL (ref 13.3–17.7)
MAGNESIUM SERPL-MCNC: 1.8 MG/DL (ref 1.6–2.3)
MCH RBC QN AUTO: 27.6 PG (ref 26.5–33)
MCHC RBC AUTO-ENTMCNC: 31.8 G/DL (ref 31.5–36.5)
MCV RBC AUTO: 87 FL (ref 78–100)
PLATELET # BLD AUTO: 246 10E3/UL (ref 150–450)
POTASSIUM BLD-SCNC: 4 MMOL/L (ref 3.4–5.3)
RBC # BLD AUTO: 4.42 10E6/UL (ref 4.4–5.9)
SODIUM SERPL-SCNC: 136 MMOL/L (ref 133–144)
WBC # BLD AUTO: 7.6 10E3/UL (ref 4–11)

## 2022-11-12 PROCEDURE — 250N000013 HC RX MED GY IP 250 OP 250 PS 637: Performed by: PHYSICIAN ASSISTANT

## 2022-11-12 PROCEDURE — 80048 BASIC METABOLIC PNL TOTAL CA: CPT | Performed by: PHYSICIAN ASSISTANT

## 2022-11-12 PROCEDURE — 250N000011 HC RX IP 250 OP 636: Performed by: PHYSICIAN ASSISTANT

## 2022-11-12 PROCEDURE — 36415 COLL VENOUS BLD VENIPUNCTURE: CPT | Performed by: PHYSICIAN ASSISTANT

## 2022-11-12 PROCEDURE — 85027 COMPLETE CBC AUTOMATED: CPT | Performed by: PHYSICIAN ASSISTANT

## 2022-11-12 PROCEDURE — 120N000002 HC R&B MED SURG/OB UMMC

## 2022-11-12 PROCEDURE — 83735 ASSAY OF MAGNESIUM: CPT

## 2022-11-12 PROCEDURE — 99232 SBSQ HOSP IP/OBS MODERATE 35: CPT | Performed by: INTERNAL MEDICINE

## 2022-11-12 RX ORDER — NALOXONE HYDROCHLORIDE 0.4 MG/ML
0.4 INJECTION, SOLUTION INTRAMUSCULAR; INTRAVENOUS; SUBCUTANEOUS
Status: DISCONTINUED | OUTPATIENT
Start: 2022-11-12 | End: 2022-11-18 | Stop reason: HOSPADM

## 2022-11-12 RX ORDER — NALOXONE HYDROCHLORIDE 0.4 MG/ML
0.2 INJECTION, SOLUTION INTRAMUSCULAR; INTRAVENOUS; SUBCUTANEOUS
Status: DISCONTINUED | OUTPATIENT
Start: 2022-11-12 | End: 2022-11-18 | Stop reason: HOSPADM

## 2022-11-12 RX ADMIN — MAGNESIUM OXIDE TAB 400 MG (241.3 MG ELEMENTAL MG) 400 MG: 400 (241.3 MG) TAB at 08:09

## 2022-11-12 RX ADMIN — LOSARTAN POTASSIUM 25 MG: 25 TABLET, FILM COATED ORAL at 08:09

## 2022-11-12 RX ADMIN — PROCHLORPERAZINE EDISYLATE 10 MG: 5 INJECTION INTRAMUSCULAR; INTRAVENOUS at 10:09

## 2022-11-12 RX ADMIN — ACETAMINOPHEN 975 MG: 325 TABLET, FILM COATED ORAL at 21:16

## 2022-11-12 RX ADMIN — ESCITALOPRAM 5 MG: 5 TABLET, FILM COATED ORAL at 08:08

## 2022-11-12 RX ADMIN — DOCUSATE SODIUM 100 MG: 100 CAPSULE, LIQUID FILLED ORAL at 08:09

## 2022-11-12 RX ADMIN — SENNOSIDES AND DOCUSATE SODIUM 1 TABLET: 50; 8.6 TABLET ORAL at 21:16

## 2022-11-12 RX ADMIN — SENNOSIDES AND DOCUSATE SODIUM 1 TABLET: 50; 8.6 TABLET ORAL at 08:09

## 2022-11-12 RX ADMIN — TRAZODONE HYDROCHLORIDE 100 MG: 50 TABLET ORAL at 21:17

## 2022-11-12 RX ADMIN — ACETAMINOPHEN 975 MG: 325 TABLET, FILM COATED ORAL at 06:29

## 2022-11-12 RX ADMIN — DOCUSATE SODIUM 100 MG: 100 CAPSULE, LIQUID FILLED ORAL at 21:17

## 2022-11-12 RX ADMIN — ACETAMINOPHEN 975 MG: 325 TABLET, FILM COATED ORAL at 13:56

## 2022-11-12 RX ADMIN — POLYETHYLENE GLYCOL 3350 17 G: 17 POWDER, FOR SOLUTION ORAL at 08:08

## 2022-11-12 RX ADMIN — ONDANSETRON 4 MG: 4 TABLET, ORALLY DISINTEGRATING ORAL at 06:58

## 2022-11-12 RX ADMIN — LOSARTAN POTASSIUM 25 MG: 25 TABLET, FILM COATED ORAL at 21:16

## 2022-11-12 RX ADMIN — ATORVASTATIN CALCIUM 20 MG: 20 TABLET, FILM COATED ORAL at 21:17

## 2022-11-12 ASSESSMENT — ACTIVITIES OF DAILY LIVING (ADL)
ADLS_ACUITY_SCORE: 37

## 2022-11-12 NOTE — PLAN OF CARE
"/76 (BP Location: Left arm)   Pulse 66   Temp 98.7  F (37.1  C) (Oral)   Resp 18   Wt 123.3 kg (271 lb 13.2 oz)   SpO2 95%   BMI 36.87 kg/m    2050 ish-2330  Pt arrived to floor, No RN-RN report was given. Per EMT pt came from [facility]  Pt had no cloth on except surgical gown. Gown was given to pt. Pt denies SOB or chest pain. NC-2L SPO2 >90's. As RN had no idea about pt's PMH. For safety purposes pt was placed on tele, continuous pulse ox, and vitals were assessed.    Provider came over to assess pt as well. Pt endorsed incisional and back pain. PCA started. Capnography monitoring. Pt now rates pain 2/10  Pt is alert and oriented x4, numbness to Left foot. Pt states that \"my right leg gives out\" when he walks. Strength/sensation intact all extremities. Pedal and radial pulse +2.  Pt has not been OOB this shift.   Pt told writer that he previously had SI, but did not attempt. Pt denies suicidal thoughts or harm to self this shift.     Pt has wound Left side groin, dressing was bloody and coming off, ABD pad was applied as writer did not have order for dressing change. Dressing covered over Lower abdomen (UTV, dressing not changed) -per pt \"doctor told me, they will change dressings.\" All dressing CDI. Per pt, he voided once since surgery. Not passing flatus. LBM: 11/11 prior surgery. Daughter came to visit pt. Pt has pudding on this shift. Denies N/V. Report given to Jonelle MA.       "

## 2022-11-12 NOTE — PHARMACY-ADMISSION MEDICATION HISTORY
Admission Medication History Completed by Pharmacy    See [facility] Admission Navigator for allergy information, preferred outpatient pharmacy, prior to admission medications and immunization status.     Medication History Sources:     Patient    Pharmacy fill hx via [employer]    Changes made to PTA medication list (reason):    Added: None    Deleted: Chelsea-C, B12, vitamin D, B complex, fexofenadine, nystatin powder (not taking per pt)    Changed:   o Added instructions to metformin (per pt)    Additional Information:    None    Prior to Admission medications    Medication Sig Last Dose Taking? Auth Provider Long Term End Date   acetaminophen 500 MG CAPS Take 1,000 mg by mouth as needed  Yes Reported, Patient     aspirin (ASA) 81 MG chewable tablet 81 mg every morning  Yes Reported, Patient     atorvastatin (LIPITOR) 20 MG tablet Take 20 mg by mouth daily  Yes Reported, Patient Yes    escitalopram (LEXAPRO) 5 MG tablet Take 5 mg by mouth daily  Yes Reported, Patient Yes    fluticasone (FLONASE) 50 MCG/ACT nasal spray Spray 2 sprays in nostril as needed  Yes Reported, Patient     ibuprofen (ADVIL/MOTRIN) 200 MG capsule Take 400 mg by mouth as needed  Yes Reported, Patient     losartan (COZAAR) 25 MG tablet Take 25 mg by mouth 2 times daily  Yes Reported, Patient Yes    magnesium oxide 400 MG CAPS Take 400 mg by mouth every morning  Yes Reported, Patient     metFORMIN (GLUCOPHAGE XR) 500 MG 24 hr tablet 1,000 mg 2 times daily (with meals)  Yes Reported, Patient Yes    traZODone (DESYREL) 100 MG tablet Take 100 mg by mouth At Bedtime  Yes Reported, Patient Yes    aluminum chloride (DRYSOL) 20 % external solution Apply topically daily Apply to wound margins daily with wound care.  Patient not taking: Reported on 11/12/2022 Not Taking  Eder Nicole MD     lidocaine (XYLOCAINE) 2 % external gel Apply topically daily  Patient not taking: Reported on 11/12/2022 Not Taking  Eder Nicole MD Yes     oxyCODONE-acetaminophen (PERCOCET) 5-325 MG tablet Take 1 tablet by mouth every 6 hours as needed for pain  at not started  Clifford Hernandez MD  11/14/22       Date completed: 11/12/22    Medication history completed by: Carmita Melgar [facility]
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
## 2022-11-12 NOTE — PROGRESS NOTES
[facility]  Transfer Triage Note    Date of call: 11/11/22  Time of call: 6:36 PM    Current Patient Location:[facility] [place]  Current Level of Care: Obs    Vitals:                      at    Diagnosis: Post op pain control  Is COVID-19 a concern? No  Reason for requested transfer: Patient has established care here   Isolation Needs: None    Outside Records: Available  Additional records may be faxed to [phone].    Transfer accepted: Yes  Stability of Patient: Patient is vitally stable, with no critical labs, and will likely remain stable throughout the transfer process  Level of Care Needed: Obs  Telemetry Needed:  None  Expected Time of Arrival for Transfer: 0-8 hours  Arrival Location:  [facility] [place] or [facility] [place]    Recommendations for Management and Stabilization: Not needed    Additional Comments: Pt is a 62 year old male with chronic low back pain who presented to the [facility] [place] for a MOHS surgery with flap closure for a large BCC (16 x 13 cm) on his left thigh.  The procedure was uncomplicated, however post operatively he had uncontrolled pain requiring IV narcotics and no ability to discharge.  No concern for other medical issues at this time.  Per anesthesia and dermatology, the feeling was due to the size of the BCC, it was unlikely an oral pain regimen would be able to be established in the next few hours and he would likely need observation and pain management consultation as the degree of pain was somewhat unexpected.      Discussed placement given current capacity, [place] [facility] [place] [facility] depending on capacity availability) for ongoing care.      José Miguel Allred MD
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
## 2022-11-12 NOTE — PLAN OF CARE
Goal Outcome Evaluation:  Alert and oriented X 4. Able to make needs known. Call light in reach. VSS and CAPNO WNLs. Maintaining O2 sats in mid 90s on 2 LPM per NC. Denies SOB or chest pain. Groin and abdominal pain managed with Dilaudid  PCA at 0. 2 mg bumps Q 20 min. C/O headache this morning, received scheduled Tylenol  Also C/O nausea around 0700, received Zofran 4 mg ODT.  Abdominal and left groin dressings intact.  Right PIV patent, infusing without difficulty. Able to shift weight and reposition self in bed. Using the urinal at bedside. Passing flatus. BG was 130 at 0334. Appears to be sleeping during rounds. Continue with plan of care.

## 2022-11-12 NOTE — PLAN OF CARE
Goal Outcome Evaluation:    Patient alert/oriented x4.   2 Liters 02, CAPNO  Assist of 1   Uses bedside urinal  Continent with bowels  ABD on abdomen and groin, CDI  Pain controlled with PCA pump and Tylenol. He had some nausea. Given Zofran at 7 and Comazine at 1009. Dr. Livingston due to nausea and metallic taste in mouth. He said to continue with PCA pump, PRN Zofran and PRN compazine.    Right PIV.      Good appetite after nausea was resolved.    Call light within reach, able to make needs known.

## 2022-11-13 LAB
GLUCOSE BLDC GLUCOMTR-MCNC: 118 MG/DL (ref 70–99)
GLUCOSE BLDC GLUCOMTR-MCNC: 126 MG/DL (ref 70–99)
GLUCOSE BLDC GLUCOMTR-MCNC: 131 MG/DL (ref 70–99)
GLUCOSE BLDC GLUCOMTR-MCNC: 137 MG/DL (ref 70–99)
GLUCOSE BLDC GLUCOMTR-MCNC: 200 MG/DL (ref 70–99)

## 2022-11-13 PROCEDURE — 250N000012 HC RX MED GY IP 250 OP 636 PS 637: Performed by: PHYSICIAN ASSISTANT

## 2022-11-13 PROCEDURE — 258N000003 HC RX IP 258 OP 636

## 2022-11-13 PROCEDURE — 250N000009 HC RX 250: Performed by: NURSE PRACTITIONER

## 2022-11-13 PROCEDURE — 250N000011 HC RX IP 250 OP 636: Performed by: PHYSICIAN ASSISTANT

## 2022-11-13 PROCEDURE — 120N000002 HC R&B MED SURG/OB UMMC

## 2022-11-13 PROCEDURE — 250N000013 HC RX MED GY IP 250 OP 250 PS 637: Performed by: PHYSICIAN ASSISTANT

## 2022-11-13 PROCEDURE — 250N000013 HC RX MED GY IP 250 OP 250 PS 637: Performed by: NURSE PRACTITIONER

## 2022-11-13 PROCEDURE — 99232 SBSQ HOSP IP/OBS MODERATE 35: CPT | Performed by: INTERNAL MEDICINE

## 2022-11-13 RX ORDER — NICOTINE POLACRILEX 4 MG
15-30 LOZENGE BUCCAL
Status: ACTIVE | OUTPATIENT
Start: 2022-11-13

## 2022-11-13 RX ORDER — DEXTROSE MONOHYDRATE 25 G/50ML
25-50 INJECTION, SOLUTION INTRAVENOUS
Status: ACTIVE | OUTPATIENT
Start: 2022-11-13

## 2022-11-13 RX ORDER — SODIUM CHLORIDE 9 MG/ML
INJECTION, SOLUTION INTRAVENOUS
Status: COMPLETED
Start: 2022-11-13 | End: 2022-11-13

## 2022-11-13 RX ORDER — SCOLOPAMINE TRANSDERMAL SYSTEM 1 MG/1
1 PATCH, EXTENDED RELEASE TRANSDERMAL
Status: DISCONTINUED | OUTPATIENT
Start: 2022-11-13 | End: 2022-11-18 | Stop reason: HOSPADM

## 2022-11-13 RX ADMIN — SCOPALAMINE 1 PATCH: 1 PATCH, EXTENDED RELEASE TRANSDERMAL at 20:13

## 2022-11-13 RX ADMIN — SENNOSIDES AND DOCUSATE SODIUM 1 TABLET: 50; 8.6 TABLET ORAL at 09:53

## 2022-11-13 RX ADMIN — ESCITALOPRAM 5 MG: 5 TABLET, FILM COATED ORAL at 09:54

## 2022-11-13 RX ADMIN — DOCUSATE SODIUM 100 MG: 100 CAPSULE, LIQUID FILLED ORAL at 20:13

## 2022-11-13 RX ADMIN — ACETAMINOPHEN 975 MG: 325 TABLET, FILM COATED ORAL at 06:13

## 2022-11-13 RX ADMIN — POLYETHYLENE GLYCOL 3350 17 G: 17 POWDER, FOR SOLUTION ORAL at 10:06

## 2022-11-13 RX ADMIN — SENNOSIDES AND DOCUSATE SODIUM 1 TABLET: 50; 8.6 TABLET ORAL at 20:13

## 2022-11-13 RX ADMIN — LOSARTAN POTASSIUM 25 MG: 25 TABLET, FILM COATED ORAL at 20:13

## 2022-11-13 RX ADMIN — TRAZODONE HYDROCHLORIDE 100 MG: 50 TABLET ORAL at 22:00

## 2022-11-13 RX ADMIN — SODIUM CHLORIDE 500 ML: 9 INJECTION, SOLUTION INTRAVENOUS at 22:20

## 2022-11-13 RX ADMIN — INSULIN ASPART 1 UNITS: 100 INJECTION, SOLUTION INTRAVENOUS; SUBCUTANEOUS at 12:05

## 2022-11-13 RX ADMIN — ATORVASTATIN CALCIUM 20 MG: 20 TABLET, FILM COATED ORAL at 20:13

## 2022-11-13 RX ADMIN — DOCUSATE SODIUM 100 MG: 100 CAPSULE, LIQUID FILLED ORAL at 09:53

## 2022-11-13 RX ADMIN — MAGNESIUM OXIDE TAB 400 MG (241.3 MG ELEMENTAL MG) 400 MG: 400 (241.3 MG) TAB at 09:54

## 2022-11-13 RX ADMIN — ONDANSETRON 4 MG: 2 INJECTION INTRAMUSCULAR; INTRAVENOUS at 17:31

## 2022-11-13 RX ADMIN — ACETAMINOPHEN 975 MG: 325 TABLET, FILM COATED ORAL at 12:06

## 2022-11-13 RX ADMIN — LOSARTAN POTASSIUM 25 MG: 25 TABLET, FILM COATED ORAL at 09:52

## 2022-11-13 RX ADMIN — LIDOCAINE HYDROCHLORIDE 30 ML: 20 SOLUTION ORAL; TOPICAL at 20:12

## 2022-11-13 RX ADMIN — FLUTICASONE PROPIONATE 2 SPRAY: 50 SPRAY, METERED NASAL at 10:05

## 2022-11-13 RX ADMIN — ACETAMINOPHEN 975 MG: 325 TABLET, FILM COATED ORAL at 20:13

## 2022-11-13 ASSESSMENT — ACTIVITIES OF DAILY LIVING (ADL)
ADLS_ACUITY_SCORE: 37
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
## 2022-11-13 NOTE — PLAN OF CARE
Patient A&O x4. On 2L NC with capnography due to dilaudid PCA. ABD replaced and re-taped on left groin. Dressing and sutures remain intact. No drainage noted on abdominal ABD. See MAR for PCA totals. PM/night insulin was not given d/t BG being 131 and 160. Able to make needs known. Call light within reach.

## 2022-11-13 NOTE — PLAN OF CARE
Alert and orientedx4, Bedrest. Makes needs known.  Pt on dialudid PCA pump.  Continent of bowel and bladder, LBM 11/11.  2L O2 via capno.  Quiet night, continue to monitor.

## 2022-11-13 NOTE — PROGRESS NOTES
/74 (BP Location: Left arm)   Pulse 65   Temp 99.1  F (37.3  C) (Oral)   Resp 18   Wt 123.3 kg (271 lb 13.2 oz)   SpO2 96%   BMI 36.87 kg/m      Pt. A&O x4  Pt. Able to use call light appropriately. Pt. No Reg diet, and BG checks with sliding scale. Pt. noob this shift.  Pt. On Capno and 2L NC, Pt. Also on Tele and PCA dilaudid. Pt. Rated pain a 3 /10. Surgical incision dressing clean dry and intact both abdominal and groin area. R PIV infusing. LBM 11/11 Numbness in the R lower extremity. Continue POC.

## 2022-11-13 NOTE — PLAN OF CARE
"Patient started complaining of \"overall\" not feeling well. Patient states that he has been feeling this way for about an hour (started around 1630). VSS. Diaphoretic and states \"I feel warm\". No fever (98.6F). blood glucose 118. Writer paged an FYI to cross-cover Provider.     "

## 2022-11-14 ENCOUNTER — TELEPHONE (OUTPATIENT)
Dept: DERMATOLOGY | Facility: CLINIC | Age: 62
End: 2022-11-14

## 2022-11-14 LAB
ANION GAP SERPL CALCULATED.3IONS-SCNC: 8 MMOL/L (ref 3–14)
BUN SERPL-MCNC: 12 MG/DL (ref 7–30)
CALCIUM SERPL-MCNC: 9 MG/DL (ref 8.5–10.1)
CHLORIDE BLD-SCNC: 102 MMOL/L (ref 94–109)
CO2 SERPL-SCNC: 27 MMOL/L (ref 20–32)
CREAT SERPL-MCNC: 0.62 MG/DL (ref 0.66–1.25)
ERYTHROCYTE [DISTWIDTH] IN BLOOD BY AUTOMATED COUNT: 13.9 % (ref 10–15)
GFR SERPL CREATININE-BSD FRML MDRD: >90 ML/MIN/1.73M2
GLUCOSE BLD-MCNC: 215 MG/DL (ref 70–99)
GLUCOSE BLDC GLUCOMTR-MCNC: 138 MG/DL (ref 70–99)
GLUCOSE BLDC GLUCOMTR-MCNC: 142 MG/DL (ref 70–99)
GLUCOSE BLDC GLUCOMTR-MCNC: 154 MG/DL (ref 70–99)
GLUCOSE BLDC GLUCOMTR-MCNC: 169 MG/DL (ref 70–99)
GLUCOSE BLDC GLUCOMTR-MCNC: 192 MG/DL (ref 70–99)
HCT VFR BLD AUTO: 38 % (ref 40–53)
HGB BLD-MCNC: 12.6 G/DL (ref 13.3–17.7)
MCH RBC QN AUTO: 28.3 PG (ref 26.5–33)
MCHC RBC AUTO-ENTMCNC: 33.2 G/DL (ref 31.5–36.5)
MCV RBC AUTO: 85 FL (ref 78–100)
PLATELET # BLD AUTO: 261 10E3/UL (ref 150–450)
POTASSIUM BLD-SCNC: 4.2 MMOL/L (ref 3.4–5.3)
RBC # BLD AUTO: 4.46 10E6/UL (ref 4.4–5.9)
SODIUM SERPL-SCNC: 137 MMOL/L (ref 133–144)
WBC # BLD AUTO: 8.1 10E3/UL (ref 4–11)

## 2022-11-14 PROCEDURE — 85014 HEMATOCRIT: CPT | Performed by: INTERNAL MEDICINE

## 2022-11-14 PROCEDURE — 250N000013 HC RX MED GY IP 250 OP 250 PS 637: Performed by: PHYSICIAN ASSISTANT

## 2022-11-14 PROCEDURE — 85048 AUTOMATED LEUKOCYTE COUNT: CPT | Performed by: INTERNAL MEDICINE

## 2022-11-14 PROCEDURE — 120N000002 HC R&B MED SURG/OB UMMC

## 2022-11-14 PROCEDURE — 80048 BASIC METABOLIC PNL TOTAL CA: CPT | Performed by: INTERNAL MEDICINE

## 2022-11-14 PROCEDURE — 99232 SBSQ HOSP IP/OBS MODERATE 35: CPT | Performed by: INTERNAL MEDICINE

## 2022-11-14 PROCEDURE — 36415 COLL VENOUS BLD VENIPUNCTURE: CPT | Performed by: INTERNAL MEDICINE

## 2022-11-14 PROCEDURE — 250N000013 HC RX MED GY IP 250 OP 250 PS 637: Performed by: INTERNAL MEDICINE

## 2022-11-14 RX ORDER — OXYCODONE HYDROCHLORIDE 5 MG/1
5-10 TABLET ORAL
Status: DISCONTINUED | OUTPATIENT
Start: 2022-11-14 | End: 2022-11-15

## 2022-11-14 RX ADMIN — HYDROXYZINE HYDROCHLORIDE 25 MG: 25 TABLET, FILM COATED ORAL at 16:05

## 2022-11-14 RX ADMIN — LOSARTAN POTASSIUM 25 MG: 25 TABLET, FILM COATED ORAL at 08:48

## 2022-11-14 RX ADMIN — FLUTICASONE PROPIONATE 2 SPRAY: 50 SPRAY, METERED NASAL at 08:48

## 2022-11-14 RX ADMIN — INSULIN ASPART 1 UNITS: 100 INJECTION, SOLUTION INTRAVENOUS; SUBCUTANEOUS at 08:48

## 2022-11-14 RX ADMIN — DOCUSATE SODIUM 100 MG: 100 CAPSULE, LIQUID FILLED ORAL at 08:48

## 2022-11-14 RX ADMIN — ESCITALOPRAM 5 MG: 5 TABLET, FILM COATED ORAL at 08:47

## 2022-11-14 RX ADMIN — SENNOSIDES AND DOCUSATE SODIUM 1 TABLET: 50; 8.6 TABLET ORAL at 08:47

## 2022-11-14 RX ADMIN — DOCUSATE SODIUM 100 MG: 100 CAPSULE, LIQUID FILLED ORAL at 20:26

## 2022-11-14 RX ADMIN — MAGNESIUM OXIDE TAB 400 MG (241.3 MG ELEMENTAL MG) 400 MG: 400 (241.3 MG) TAB at 08:48

## 2022-11-14 RX ADMIN — SENNOSIDES AND DOCUSATE SODIUM 1 TABLET: 50; 8.6 TABLET ORAL at 20:26

## 2022-11-14 RX ADMIN — OXYCODONE HYDROCHLORIDE 10 MG: 5 TABLET ORAL at 22:22

## 2022-11-14 RX ADMIN — OXYCODONE HYDROCHLORIDE 10 MG: 5 TABLET ORAL at 17:51

## 2022-11-14 RX ADMIN — TRAZODONE HYDROCHLORIDE 100 MG: 50 TABLET ORAL at 22:18

## 2022-11-14 RX ADMIN — ACETAMINOPHEN 975 MG: 325 TABLET, FILM COATED ORAL at 12:48

## 2022-11-14 RX ADMIN — ACETAMINOPHEN 975 MG: 325 TABLET, FILM COATED ORAL at 20:26

## 2022-11-14 RX ADMIN — ATORVASTATIN CALCIUM 20 MG: 20 TABLET, FILM COATED ORAL at 20:26

## 2022-11-14 RX ADMIN — ACETAMINOPHEN 975 MG: 325 TABLET, FILM COATED ORAL at 05:44

## 2022-11-14 RX ADMIN — INSULIN ASPART 1 UNITS: 100 INJECTION, SOLUTION INTRAVENOUS; SUBCUTANEOUS at 12:38

## 2022-11-14 RX ADMIN — METFORMIN HYDROCHLORIDE 1000 MG: 500 TABLET, EXTENDED RELEASE ORAL at 17:36

## 2022-11-14 RX ADMIN — POLYETHYLENE GLYCOL 3350 17 G: 17 POWDER, FOR SOLUTION ORAL at 08:48

## 2022-11-14 RX ADMIN — LOSARTAN POTASSIUM 25 MG: 25 TABLET, FILM COATED ORAL at 20:26

## 2022-11-14 ASSESSMENT — ACTIVITIES OF DAILY LIVING (ADL)
ADLS_ACUITY_SCORE: 38
ADLS_ACUITY_SCORE: 38
ADLS_ACUITY_SCORE: 37
ADLS_ACUITY_SCORE: 38

## 2022-11-14 NOTE — PLAN OF CARE
[id number]    Pt is AOx4 and able to make needs known. Pain managed with dilaudid PCA and scheduled tylenol. Dressings to abdomen and L.Groin CDI. Changed this shift using Vaseline gauze and ABD pads. Denies SOB, chest pain, and nausea. Scopolamine patch in place behind right ear. Some numbness and decreased sensation to RLE. Voiding spontaneously, LBM 11/11. Itching rash to upper right arm due to blood pressure cuff. PRN atarax given. Pt stood at side of bed for approximately one minute with slight lightheadedness. 8 doses of the dilaudid were given via PCA over the shift. PCA was discontinued at 1750 and transitioned to PRN oxy.
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
## 2022-11-14 NOTE — TELEPHONE ENCOUNTER
Resident is on the way to visit patient today at hospital and will change bandage when there.    Debra ROSARIO RN

## 2022-11-14 NOTE — PLAN OF CARE
A&Ox4, calm and cooperative  Pain has been minimal- using dilaudid PCA min amount  Denies SOB, CP, dizziness, N/T or nausea  Dressing to abdomen and left groin area- CDI  Using urinal to void  No BM this shift  Has not been OOB  Pt becomes bradycardic 45-50 while asleep- asymptomatic   BG @ 0200- 138   Capno still in place   Rounded frequently on pt

## 2022-11-14 NOTE — PROGRESS NOTES
Patient started complaining of chest pain on and off. Writer paged cross coverage provider. One time dose of lidocaine and maalox (GI cocktail) and scopolamine patch prescribed.

## 2022-11-14 NOTE — PROCEDURES
"[facility] Dermatologic Surgery Clinic [place] Procedure Note.  Case performed in [facility]      Date of Service:  November 11th 2022  Surgery: Mohs micrographic surgery    Case 1  Repair Type:intermediate closure and full thickness skin  graft  Repair Size: 10x8 cm graft, 20 cm linear closure  Suture Material: 3.0 Vicryl, 4.0 Prolene  Tumor Type:BCC  Location: L thigh  Derm-Path Accession #: [id number]  PreOp Size:18x16 cm  PostOp Size: 22x20 cm  Mohs Accession #:   Level of Defect: fascia  Fellow: Steffen    Per patient's request procedure was performed under IV sedation due to patient's difficulty with the pain from injection of local anesthesia for his biopsy which he believes gave him \"stress related hearing loss.\"  Procedure:  We discussed the principles of treatment and most likely complications including scarring, bleeding, infection, swelling, pain, crusting, nerve damage, large wound,  incomplete excision, wound dehiscence,  nerve damage, recurrence, and a second procedure may be recommended to obtain the best cosmetic or functional result.    Informed consent was obtained and the patient underwent the procedure as follows:  The patient was sedated with MAC by anesthesia per their note  The patient was placed supine on the operating table.   The left leg was placed in a stirrup to allow visualization of the posterior aspect of the tumor The cancer was identified, outlined with a marker, and verified by the patient.  The entire surgical field was prepped with chlorhexadine.  The surgical site was anesthetized using 1% lidocaine with epinephrine mixed with 0.25% bupivicaine.      The area of clinically apparent tumor was not debulked. The layer of tissue was then surgically excised using a #15 blade and was then transferred onto a specimen sheet maintaining the orientation of the specimen. Hemostasis was obtained using electrocoagulation. The wound site was then covered with a dressing while " the tissue samples were processed for examination.    The excised tissue was transported to the Mohs histology laboratory maintaining the tissue orientation.  The tissue specimen was relaxed so that the entire surgical margin was in a a single horizontal plane for sectioning and inked for precise mapping.  A precise reference map was drawn to reflect the sectioning of the specimen, colored inking of the margins, and orientation on the patient. The tissue was processed using horizontal sectioning of the base and continuous peripheral margins.  The histopathologic sections were reviewed in conjunction with the reference map.    Total blocks: 62    Total slides:  124    There was BCC visualized focally at the skin edge of piece 31 on examination, that will be resected at a later date due to avoid prolonged anesthesia time and the small and superficial amount of positivity does not represent an immediate danger.    Intermediate closure and Full Thickness Skin Graft    INDICATIONS:  The patient is status post Mohs' cutaneous micrographic excision.  After consideration of all the options, it was determined that a full thickness skin graft would offer the best chance for preservation of normal anatomic and functional relationships.  The patient understood the procedure and risks which include bleeding, infection, graft failure, scar formation, trapdooring and discomfort.  Informed consent was obtained in writing.  The patient understood that a skin graft will go through color changes and may require refinement post-operatively.  The patient was informed that perfect matches may not be possible.  The patient then underwent the procedure as follows:      PROCEDURE:  The patient was supine on the operating room table.  The defect was identified. The abdominal donor site was chosen as this had the closest color and texture match to the surrounding defect skin.   An anticipated graft design was drawn at the donor site.  The  graft recipient and donor sites were then infiltrated with 1% lidocaine with epinephrine and both areas were prepped with chlorhexadine and draped in a sterile fashion.    The graft was then harvested excising it with a # 15 scalpel blade.  (Graft size 10x8 cm). The graft was placed on saline-soaked gauze.  The donor site wound edges were then undermined.  Hemostasis was obtained with electrocoagulation.  The wound edges were then approximated and the edges were closed with 4.0 Monocryl deeps and 4.0 Prolene running epidermal sutures.    Trimming all fat and fibrous tissue from it then thinned the graft.  All visible hair follicles were extracted.  The posterior and superior wound aspects were closed closed with 3.0 Vicryl deep sutures and 5.0 FAG sutures until excessive tension was detected.  The length of the linear portion of the closure was 20 cm.   The graft was then laid over the remaining defect and secured with anchoring sutures.  The graft was then trimmed and fully secured using 5.0 FAG  epidermal sutures.  A bolster dressing was placed, which was composed of a Xeroform and vaseline impregnated gauze, polysporin, and secured with bolster sutures.  Wound care instructions were provided in writing and verbally.  The patient left the operating room in good condition and will return in seven to ten days for bolster suture removal and graft evaluation.     Post-Operative Size: 10x8cm graft and 20 cm     The attending surgeon was present for entire/portion of repair and always immediately available.        Attending attestation:  I was present for key elements of the procedure and immediately available for all other portions of the procedure.  I have reviewed the note and edited it as necessary.    Clifford Hernandez M.D.  Professor  Director of Dermatologic Surgery  Department of Dermatology  [facility]    Dermatology Surgery Clinic  [facility] [address]   , [place] [address]

## 2022-11-14 NOTE — TELEPHONE ENCOUNTER
Health Call Center    Phone Message    May a detailed message be left on voicemail: yes     Reason for Call: Other: Patient is in hospital right now due to pain from his MOHS procedure. Patient states hospital staff do not want to do his bandage change as it should be handled by someone on his surgery team. Patient is at [facility] Room 518 and would like someone to come over and do his bandage change today if possible.    Please call to discuss  Thank you    Action Taken: Message routed to:  [facility] ([facility] Derm Surg    Travel Screening: Not Applicable

## 2022-11-14 NOTE — PLAN OF CARE
Goal Outcome Evaluation:     Patient A&Ox4 for duration of the shift. Numbness present in right lower leg, denies SOB. Patient had an episode of chest pain and nausea around 17:30 and 18:30 (see previous notes). Provider contacted and 1 time dose of GI Cocktail prescribed. Scopolamine patch placed behind right ear. ABD on abdomen and groin CDI. PCA dilaudid was pushed three times for a total of 0.6mg. Patient able to make needs known, call light in place. Continue with plan of care.

## 2022-11-15 LAB
GLUCOSE BLDC GLUCOMTR-MCNC: 130 MG/DL (ref 70–99)
GLUCOSE BLDC GLUCOMTR-MCNC: 131 MG/DL (ref 70–99)
GLUCOSE BLDC GLUCOMTR-MCNC: 136 MG/DL (ref 70–99)
GLUCOSE BLDC GLUCOMTR-MCNC: 180 MG/DL (ref 70–99)
GLUCOSE BLDC GLUCOMTR-MCNC: 202 MG/DL (ref 70–99)

## 2022-11-15 PROCEDURE — 250N000013 HC RX MED GY IP 250 OP 250 PS 637: Performed by: PHYSICIAN ASSISTANT

## 2022-11-15 PROCEDURE — 250N000011 HC RX IP 250 OP 636: Performed by: PHYSICIAN ASSISTANT

## 2022-11-15 PROCEDURE — 250N000013 HC RX MED GY IP 250 OP 250 PS 637: Performed by: INTERNAL MEDICINE

## 2022-11-15 PROCEDURE — 120N000002 HC R&B MED SURG/OB UMMC

## 2022-11-15 PROCEDURE — 99232 SBSQ HOSP IP/OBS MODERATE 35: CPT | Performed by: PHYSICIAN ASSISTANT

## 2022-11-15 RX ORDER — METHOCARBAMOL 500 MG/1
500 TABLET, FILM COATED ORAL 4 TIMES DAILY
Status: DISCONTINUED | OUTPATIENT
Start: 2022-11-15 | End: 2022-11-18 | Stop reason: HOSPADM

## 2022-11-15 RX ORDER — HYDROMORPHONE HYDROCHLORIDE 1 MG/ML
.3-.5 INJECTION, SOLUTION INTRAMUSCULAR; INTRAVENOUS; SUBCUTANEOUS EVERY 6 HOURS PRN
Status: DISCONTINUED | OUTPATIENT
Start: 2022-11-15 | End: 2022-11-16

## 2022-11-15 RX ORDER — KETOROLAC TROMETHAMINE 30 MG/ML
30 INJECTION, SOLUTION INTRAMUSCULAR; INTRAVENOUS EVERY 6 HOURS
Status: DISCONTINUED | OUTPATIENT
Start: 2022-11-15 | End: 2022-11-16

## 2022-11-15 RX ORDER — POLYETHYLENE GLYCOL 3350 17 G/17G
17 POWDER, FOR SOLUTION ORAL 2 TIMES DAILY
Status: DISCONTINUED | OUTPATIENT
Start: 2022-11-15 | End: 2022-11-18 | Stop reason: HOSPADM

## 2022-11-15 RX ORDER — OXYCODONE HYDROCHLORIDE 10 MG/1
10-20 TABLET ORAL
Status: DISCONTINUED | OUTPATIENT
Start: 2022-11-15 | End: 2022-11-16

## 2022-11-15 RX ADMIN — SENNOSIDES AND DOCUSATE SODIUM 2 TABLET: 50; 8.6 TABLET ORAL at 20:11

## 2022-11-15 RX ADMIN — TRAZODONE HYDROCHLORIDE 100 MG: 50 TABLET ORAL at 21:55

## 2022-11-15 RX ADMIN — METFORMIN HYDROCHLORIDE 1000 MG: 500 TABLET, EXTENDED RELEASE ORAL at 09:15

## 2022-11-15 RX ADMIN — ESCITALOPRAM 5 MG: 5 TABLET, FILM COATED ORAL at 09:15

## 2022-11-15 RX ADMIN — METHOCARBAMOL 500 MG: 500 TABLET ORAL at 16:07

## 2022-11-15 RX ADMIN — FLUTICASONE PROPIONATE 2 SPRAY: 50 SPRAY, METERED NASAL at 09:14

## 2022-11-15 RX ADMIN — MAGNESIUM OXIDE TAB 400 MG (241.3 MG ELEMENTAL MG) 400 MG: 400 (241.3 MG) TAB at 09:15

## 2022-11-15 RX ADMIN — METFORMIN HYDROCHLORIDE 1000 MG: 500 TABLET, EXTENDED RELEASE ORAL at 18:19

## 2022-11-15 RX ADMIN — OXYCODONE HYDROCHLORIDE 20 MG: 10 TABLET ORAL at 12:00

## 2022-11-15 RX ADMIN — DOCUSATE SODIUM 100 MG: 100 CAPSULE, LIQUID FILLED ORAL at 09:15

## 2022-11-15 RX ADMIN — SENNOSIDES AND DOCUSATE SODIUM 1 TABLET: 50; 8.6 TABLET ORAL at 09:15

## 2022-11-15 RX ADMIN — METHOCARBAMOL 500 MG: 500 TABLET ORAL at 13:44

## 2022-11-15 RX ADMIN — ACETAMINOPHEN 975 MG: 325 TABLET, FILM COATED ORAL at 06:14

## 2022-11-15 RX ADMIN — INSULIN ASPART 1 UNITS: 100 INJECTION, SOLUTION INTRAVENOUS; SUBCUTANEOUS at 12:27

## 2022-11-15 RX ADMIN — POLYETHYLENE GLYCOL 3350 17 G: 17 POWDER, FOR SOLUTION ORAL at 09:15

## 2022-11-15 RX ADMIN — OXYCODONE HYDROCHLORIDE 10 MG: 5 TABLET ORAL at 01:27

## 2022-11-15 RX ADMIN — KETOROLAC TROMETHAMINE 30 MG: 30 INJECTION, SOLUTION INTRAMUSCULAR at 20:11

## 2022-11-15 RX ADMIN — ATORVASTATIN CALCIUM 20 MG: 20 TABLET, FILM COATED ORAL at 20:11

## 2022-11-15 RX ADMIN — HYDROMORPHONE HYDROCHLORIDE 0.5 MG: 1 INJECTION, SOLUTION INTRAMUSCULAR; INTRAVENOUS; SUBCUTANEOUS at 21:55

## 2022-11-15 RX ADMIN — LOSARTAN POTASSIUM 25 MG: 25 TABLET, FILM COATED ORAL at 20:11

## 2022-11-15 RX ADMIN — KETOROLAC TROMETHAMINE 30 MG: 30 INJECTION, SOLUTION INTRAMUSCULAR at 13:44

## 2022-11-15 RX ADMIN — ACETAMINOPHEN 975 MG: 325 TABLET, FILM COATED ORAL at 20:11

## 2022-11-15 RX ADMIN — LOSARTAN POTASSIUM 25 MG: 25 TABLET, FILM COATED ORAL at 09:15

## 2022-11-15 RX ADMIN — OXYCODONE HYDROCHLORIDE 10 MG: 5 TABLET ORAL at 06:16

## 2022-11-15 RX ADMIN — METHOCARBAMOL 500 MG: 500 TABLET ORAL at 20:11

## 2022-11-15 RX ADMIN — ACETAMINOPHEN 975 MG: 325 TABLET, FILM COATED ORAL at 12:00

## 2022-11-15 RX ADMIN — POLYETHYLENE GLYCOL 3350 17 G: 17 POWDER, FOR SOLUTION ORAL at 20:11

## 2022-11-15 ASSESSMENT — ACTIVITIES OF DAILY LIVING (ADL)
ADLS_ACUITY_SCORE: 38

## 2022-11-15 NOTE — PLAN OF CARE
VS:     /77 (BP Location: Left arm, Patient Position: Semi-Oliveira's, Cuff Size: Adult Regular)   Pulse 61   Temp 98.6  F (37  C) (Oral)   Resp 18   Wt 123.3 kg (271 lb 13.2 oz)   SpO2 94%   BMI 36.87 kg/m      Pt A/O X 4. Afebrile. VSS. Lungs- clear bilaterally with both       anterior and posterior. IS encouraged. Denies nausea, shortness of breath, and chest pain.     Output:       Bowels- active in all four quadrants. Voids spontaneously without   difficulty in the urinal at bedside. Last bowel movement 11/11/22, passing flatus.     Activity:       Pt up with assist of 1 with walker and gait belt.     Skin:   Left groin wound, abdominal wound, dressings CDI. Dressing change due at 1600.     Pain:       Has pain in the left groin and given 20mg Oxy PRN, IV Toradol, ands Robaxin. Patient spoke with physician today about being seen by pain management team. Pain medications updated.      CMS:       CMS and Neuro's are intact. Baseline numbness and tingling in right leg.      Dressing:       Left groin and abdominal incisional dressings are CDI.      Diet:       Pt is on a regular diet and appetite was good this shift.       LDA:       PIV is patent in the right forearm and saline locked.      Equipment:       IV pole, personal belongings     Plan:       Pt is able to make needs known and the call light is within the pt's reach. Continue to monitor.       Additional Info:

## 2022-11-15 NOTE — PROGRESS NOTES
Pt. A&O x4 able to make needs known and use call light effectively. Pt. Slept intermittently. Pt. Complained about pain at the surgical site groin area and lower abdomin. Dressing DCI dressing intact. Pt. NOOB ths shift. Pt. Was given 10mg oxy x 2 this shift. Pt. Rated pain 8/10. Pt. Using urinal at bedside with good output. NO acute changes this shift.  Continue POC

## 2022-11-15 NOTE — CONSULTS
[facility] Inpatient Consult Dermatology Note    Impression/Plan:    Post-operative pain s/p MMS 11/14 for Giant BCC, left groin/scrotum s/p same-day reconstruction with FTSG from abdomen.   - Stop PCA, patient can have NSAIDs, IV Toradol, oral opioids (if okay per primary team), etc.   - Please change dressing daily after cleansing with chlorhexidine. Xeroform gauze along all suture lines followed by ABD pads lightly taped into place and diaper.  - Regarding the prior surgery, discussed with nurse that there is a small area of peripheral tumor positivity at the 11'o clock area on the graft. This part of the tumor appeared to be mostly superficial BCC, therefore we are okay with waiting until the skin graft has healed and even after the patient has had his back surgery.  - Please inform dermatology at time of discharge to arrange for follow-up procedure.      Thank you for the dermatology consultation. Please do not hesitate to contact the dermatology resident/faculty on call for any additional questions or concerns. We will continue to follow.     Patient discussed with attending physician, Dr. Hernandez and Dr. Duke Walker MD  Dermatology Resident      Dermatology Problem List:  # NMSC   - Giant BCC, left groin/scrotum s/p MMS 11/11/22 with positive margin, will need follow-up procedure    Date of Admission: Nov 11, 2022   Encounter Date: 11/14/2022    Reason for Consultation:   Post-op pain after MMS    History of Present Illness:  Mr. Fish Abad is a 62 year old male admitted 11/11 for post-operative pain after Mohs micrographic surgery  extirpation of giant BCC on the left groin/scrotum with FTSG from abdomen for reconstruction. Patient doing well today, still with pain. Still on PCA.     Past Medical History:   Patient Active Problem List   Diagnosis     Acute bilateral low back pain with right-sided sciatica     Allergic rhinitis     CPAP (continuous positive airway  pressure) dependence     Decreased activity tolerance     Hx of tonsillectomy     Left renal stone     FRANKY (obstructive sleep apnea)     Weakness of trunk musculature     Non-healing left groin open wound     BCC (basal cell carcinoma), leg, left     Acute post-operative pain     Past Medical History:   Diagnosis Date     BCC (basal cell carcinoma), leg, left      Benign essential hypertension      Chronic back pain      Depression      Diabetes mellitus (H)      Gastroesophageal reflux disease without esophagitis      Mixed hyperlipidemia      Seasonal allergic rhinitis      Past Surgical History:   Procedure Laterality Date     LITHOTRIPSY  03/2022     MOHS MICROGRAPHIC PROCEDURE Left 11/11/2022    Procedure: MOHS MICROGRAPHIC SURGERY left groin with flap closure;  Surgeon: Clifford Hernandez MD;  Location: [facility] OR     REPAIR MOHS Left 11/11/2022    Procedure: CLOSURE, MOHS PROCEDURE DEFECT ABDOMEN;  Surgeon: Clifford Hernandez MD;  Location: [facility] OR     VASECTOMY           Social History:  Patient reports that he quit smoking about 3 months ago. His smoking use included cigarettes. He has a 1.20 pack-year smoking history. He has never used smokeless tobacco. He reports current alcohol use of about 5.0 standard drinks per week. He reports that he does not use drugs.    Family History:  Family History   Problem Relation Age of Onset     Diabetes Mother      Prostate Cancer Father      Anesthesia Reaction No family hx of      Thrombosis No family hx of        Medications:  Current Facility-Administered Medications   Medication     acetaminophen (TYLENOL) tablet 975 mg     atorvastatin (LIPITOR) tablet 20 mg     glucose gel 15-30 g    Or     dextrose 50 % injection 25-50 mL    Or     glucagon injection 1 mg     docusate sodium (COLACE) capsule 100 mg     escitalopram (LEXAPRO) tablet 5 mg     fluticasone (FLONASE) 50 MCG/ACT spray 2 spray     HYDROmorphone (PF) (DILAUDID) injection 0.3 mg     hydrOXYzine (ATARAX) tablet 25 mg      insulin aspart (NovoLOG) injection (RAPID ACTING)     insulin aspart (NovoLOG) injection (RAPID ACTING)     lidocaine (LMX4) cream     lidocaine 1 % 0.1-1 mL     losartan (COZAAR) tablet 25 mg     magnesium oxide (MAG-OX) tablet 400 mg     metFORMIN (GLUCOPHAGE XR) 24 hr tablet 1,000 mg     naloxone (NARCAN) injection 0.2 mg    Or     naloxone (NARCAN) injection 0.4 mg    Or     naloxone (NARCAN) injection 0.2 mg    Or     naloxone (NARCAN) injection 0.4 mg     ondansetron (ZOFRAN ODT) ODT tab 4 mg    Or     ondansetron (ZOFRAN) injection 4 mg     oxyCODONE (ROXICODONE) tablet 5-10 mg     polyethylene glycol (MIRALAX) Packet 17 g     prochlorperazine (COMPAZINE) injection 10 mg    Or     prochlorperazine (COMPAZINE) tablet 10 mg    Or     prochlorperazine (COMPAZINE) suppository 25 mg     scopolamine (TRANSDERM) 72 hr patch 1 patch    And     scopolamine (TRANSDERM-SCOP) Patch in Place     senna-docusate (SENOKOT-S/PERICOLACE) 8.6-50 MG per tablet 1 tablet    Or     senna-docusate (SENOKOT-S/PERICOLACE) 8.6-50 MG per tablet 2 tablet     sodium chloride (PF) 0.9% PF flush 3 mL     sodium chloride (PF) 0.9% PF flush 3 mL     traZODone (DESYREL) tablet 100 mg     Facility-Administered Medications Ordered in Other Encounters   Medication     glucose gel 15-30 g    Or     dextrose 50 % injection 25-50 mL    Or     glucagon injection 1 mg          Allergies   Allergen Reactions     Canagliflozin Other (See Comments)     Groin wound/gangrene     Food GI Disturbance     Lactose      Penicillins Rash     Seasonal Allergies Other (See Comments)         Review of Systems:  -As per HPI      Physical exam:  Vitals: /70 (BP Location: Left arm)   Pulse 61   Temp 98.6  F (37  C) (Oral)   Resp 18   Wt 123.3 kg (271 lb 13.2 oz)   SpO2 93%   BMI 36.87 kg/m    GEN: This is a well developed, well-nourished male in no acute distress, in a pleasant mood.    SKIN: Focused examination of the groin abdomen was  performed.  -Nelson skin type: NA  - There is a large linear well-healing scar across the lower abdomen   - There is a large, well-healing skin graft on the left groin/scrotum with no evidence of erythema, induration, infection, or purulent drainage   -No other lesions of concern on areas examined.     Laboratory:  Results for orders placed or performed during the hospital encounter of 11/11/22 (from the past 24 hour(s))   Glucose by meter   Result Value Ref Range    GLUCOSE BY METER POCT 137 (H) 70 - 99 mg/dL   Glucose by meter   Result Value Ref Range    GLUCOSE BY METER POCT 138 (H) 70 - 99 mg/dL   Glucose by meter   Result Value Ref Range    GLUCOSE BY METER POCT 154 (H) 70 - 99 mg/dL   CBC with platelets   Result Value Ref Range    WBC Count 8.1 4.0 - 11.0 10e3/uL    RBC Count 4.46 4.40 - 5.90 10e6/uL    Hemoglobin 12.6 (L) 13.3 - 17.7 g/dL    Hematocrit 38.0 (L) 40.0 - 53.0 %    MCV 85 78 - 100 fL    MCH 28.3 26.5 - 33.0 pg    MCHC 33.2 31.5 - 36.5 g/dL    RDW 13.9 10.0 - 15.0 %    Platelet Count 261 150 - 450 10e3/uL   Basic metabolic panel   Result Value Ref Range    Sodium 137 133 - 144 mmol/L    Potassium 4.2 3.4 - 5.3 mmol/L    Chloride 102 94 - 109 mmol/L    Carbon Dioxide (CO2)      Anion Gap      Urea Nitrogen      Creatinine      Calcium      Glucose      GFR Estimate     Glucose by meter   Result Value Ref Range    GLUCOSE BY METER POCT 169 (H) 70 - 99 mg/dL   Glucose by meter   Result Value Ref Range    GLUCOSE BY METER POCT 142 (H) 70 - 99 mg/dL        staffed the patient.    Staff Involved:  Resident/Staff    Case reviewed with resident.    Mr. Abad's repair is doing well.  Continue wound care as above.  He has a history of chronic back pain with high levels of baseline pain prior to surgery.  I would strongly suggest a pain service consult if transitioning to non-opiate pain medication is proving challenging.  Kvngy is safe to discharge from a dermatologic surgery  perspective.      Clifford Hernandez MD
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
## 2022-11-16 ENCOUNTER — APPOINTMENT (OUTPATIENT)
Dept: PHYSICAL THERAPY | Facility: CLINIC | Age: 62
End: 2022-11-16
Attending: PHYSICIAN ASSISTANT
Payer: COMMERCIAL

## 2022-11-16 ENCOUNTER — APPOINTMENT (OUTPATIENT)
Dept: OCCUPATIONAL THERAPY | Facility: CLINIC | Age: 62
End: 2022-11-16
Attending: PHYSICIAN ASSISTANT
Payer: COMMERCIAL

## 2022-11-16 LAB
GLUCOSE BLDC GLUCOMTR-MCNC: 108 MG/DL (ref 70–99)
GLUCOSE BLDC GLUCOMTR-MCNC: 148 MG/DL (ref 70–99)
GLUCOSE BLDC GLUCOMTR-MCNC: 149 MG/DL (ref 70–99)
GLUCOSE BLDC GLUCOMTR-MCNC: 155 MG/DL (ref 70–99)

## 2022-11-16 PROCEDURE — 250N000011 HC RX IP 250 OP 636: Performed by: PHYSICIAN ASSISTANT

## 2022-11-16 PROCEDURE — 250N000013 HC RX MED GY IP 250 OP 250 PS 637: Performed by: PHYSICIAN ASSISTANT

## 2022-11-16 PROCEDURE — 97535 SELF CARE MNGMENT TRAINING: CPT | Mod: GO

## 2022-11-16 PROCEDURE — 99254 IP/OBS CNSLTJ NEW/EST MOD 60: CPT | Performed by: PHYSICIAN ASSISTANT

## 2022-11-16 PROCEDURE — 97116 GAIT TRAINING THERAPY: CPT | Mod: GP | Performed by: PHYSICAL THERAPIST

## 2022-11-16 PROCEDURE — 97161 PT EVAL LOW COMPLEX 20 MIN: CPT | Mod: GP | Performed by: PHYSICAL THERAPIST

## 2022-11-16 PROCEDURE — 120N000002 HC R&B MED SURG/OB UMMC

## 2022-11-16 PROCEDURE — 97165 OT EVAL LOW COMPLEX 30 MIN: CPT | Mod: GO

## 2022-11-16 PROCEDURE — 250N000009 HC RX 250: Performed by: NURSE PRACTITIONER

## 2022-11-16 RX ORDER — HYDROMORPHONE HYDROCHLORIDE 2 MG/1
2-4 TABLET ORAL
Status: DISCONTINUED | OUTPATIENT
Start: 2022-11-16 | End: 2022-11-18 | Stop reason: HOSPADM

## 2022-11-16 RX ORDER — HYDROMORPHONE HYDROCHLORIDE 2 MG/1
4 TABLET ORAL
Status: DISCONTINUED | OUTPATIENT
Start: 2022-11-16 | End: 2022-11-16

## 2022-11-16 RX ORDER — GABAPENTIN 100 MG/1
100 CAPSULE ORAL 3 TIMES DAILY
Status: DISCONTINUED | OUTPATIENT
Start: 2022-11-16 | End: 2022-11-18 | Stop reason: HOSPADM

## 2022-11-16 RX ORDER — HYDROMORPHONE HCL IN WATER/PF 6 MG/30 ML
.2-.4 PATIENT CONTROLLED ANALGESIA SYRINGE INTRAVENOUS EVERY 6 HOURS PRN
Status: ACTIVE | OUTPATIENT
Start: 2022-11-16 | End: 2022-11-17

## 2022-11-16 RX ORDER — BENZOCAINE/MENTHOL 6 MG-10 MG
LOZENGE MUCOUS MEMBRANE 2 TIMES DAILY
Status: DISCONTINUED | OUTPATIENT
Start: 2022-11-16 | End: 2022-11-18 | Stop reason: HOSPADM

## 2022-11-16 RX ORDER — KETOROLAC TROMETHAMINE 15 MG/ML
15 INJECTION, SOLUTION INTRAMUSCULAR; INTRAVENOUS EVERY 6 HOURS
Status: COMPLETED | OUTPATIENT
Start: 2022-11-16 | End: 2022-11-18

## 2022-11-16 RX ADMIN — MAGNESIUM OXIDE TAB 400 MG (241.3 MG ELEMENTAL MG) 400 MG: 400 (241.3 MG) TAB at 08:10

## 2022-11-16 RX ADMIN — ACETAMINOPHEN 975 MG: 325 TABLET, FILM COATED ORAL at 12:31

## 2022-11-16 RX ADMIN — ACETAMINOPHEN 975 MG: 325 TABLET, FILM COATED ORAL at 06:18

## 2022-11-16 RX ADMIN — ONDANSETRON 4 MG: 4 TABLET, ORALLY DISINTEGRATING ORAL at 08:31

## 2022-11-16 RX ADMIN — METFORMIN HYDROCHLORIDE 1000 MG: 500 TABLET, EXTENDED RELEASE ORAL at 08:10

## 2022-11-16 RX ADMIN — GABAPENTIN 100 MG: 100 CAPSULE ORAL at 13:29

## 2022-11-16 RX ADMIN — METHOCARBAMOL 500 MG: 500 TABLET ORAL at 12:31

## 2022-11-16 RX ADMIN — METHOCARBAMOL 500 MG: 500 TABLET ORAL at 08:10

## 2022-11-16 RX ADMIN — KETOROLAC TROMETHAMINE 30 MG: 30 INJECTION, SOLUTION INTRAMUSCULAR at 08:10

## 2022-11-16 RX ADMIN — SCOPALAMINE 1 PATCH: 1 PATCH, EXTENDED RELEASE TRANSDERMAL at 20:40

## 2022-11-16 RX ADMIN — KETOROLAC TROMETHAMINE 15 MG: 15 INJECTION, SOLUTION INTRAMUSCULAR; INTRAVENOUS at 15:08

## 2022-11-16 RX ADMIN — INSULIN ASPART 1 UNITS: 100 INJECTION, SOLUTION INTRAVENOUS; SUBCUTANEOUS at 18:38

## 2022-11-16 RX ADMIN — ACETAMINOPHEN 975 MG: 325 TABLET, FILM COATED ORAL at 20:25

## 2022-11-16 RX ADMIN — KETOROLAC TROMETHAMINE 15 MG: 15 INJECTION, SOLUTION INTRAMUSCULAR; INTRAVENOUS at 20:25

## 2022-11-16 RX ADMIN — METHOCARBAMOL 500 MG: 500 TABLET ORAL at 20:26

## 2022-11-16 RX ADMIN — METHOCARBAMOL 500 MG: 500 TABLET ORAL at 15:59

## 2022-11-16 RX ADMIN — POLYETHYLENE GLYCOL 3350 17 G: 17 POWDER, FOR SOLUTION ORAL at 20:26

## 2022-11-16 RX ADMIN — LOSARTAN POTASSIUM 25 MG: 25 TABLET, FILM COATED ORAL at 08:10

## 2022-11-16 RX ADMIN — ESCITALOPRAM 5 MG: 5 TABLET, FILM COATED ORAL at 08:10

## 2022-11-16 RX ADMIN — METFORMIN HYDROCHLORIDE 1000 MG: 500 TABLET, EXTENDED RELEASE ORAL at 18:38

## 2022-11-16 RX ADMIN — SENNOSIDES AND DOCUSATE SODIUM 2 TABLET: 50; 8.6 TABLET ORAL at 20:26

## 2022-11-16 RX ADMIN — GABAPENTIN 100 MG: 100 CAPSULE ORAL at 20:26

## 2022-11-16 RX ADMIN — ATORVASTATIN CALCIUM 20 MG: 20 TABLET, FILM COATED ORAL at 20:26

## 2022-11-16 RX ADMIN — PROCHLORPERAZINE MALEATE 10 MG: 10 TABLET ORAL at 10:30

## 2022-11-16 RX ADMIN — HYDROMORPHONE HYDROCHLORIDE 4 MG: 2 TABLET ORAL at 10:30

## 2022-11-16 RX ADMIN — POLYETHYLENE GLYCOL 3350 17 G: 17 POWDER, FOR SOLUTION ORAL at 08:10

## 2022-11-16 RX ADMIN — KETOROLAC TROMETHAMINE 30 MG: 30 INJECTION, SOLUTION INTRAMUSCULAR at 02:31

## 2022-11-16 RX ADMIN — HYDROCORTISONE: 1 CREAM TOPICAL at 15:59

## 2022-11-16 RX ADMIN — INSULIN ASPART 1 UNITS: 100 INJECTION, SOLUTION INTRAVENOUS; SUBCUTANEOUS at 12:35

## 2022-11-16 RX ADMIN — SENNOSIDES AND DOCUSATE SODIUM 2 TABLET: 50; 8.6 TABLET ORAL at 08:10

## 2022-11-16 RX ADMIN — FLUTICASONE PROPIONATE 2 SPRAY: 50 SPRAY, METERED NASAL at 08:10

## 2022-11-16 ASSESSMENT — ACTIVITIES OF DAILY LIVING (ADL)
ADLS_ACUITY_SCORE: 38
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
## 2022-11-16 NOTE — PLAN OF CARE
VS:     /69 (BP Location: Left arm)   Pulse 53   Temp 98  F (36.7  C) (Oral)   Resp 15   Wt 123.3 kg (271 lb 13.2 oz)   SpO2 91%   BMI 36.87 kg/m      Pt A/O X 4. Afebrile. VSS. Lungs- clear bilaterally with both       anterior and posterior. IS encouraged. Denies nausea, shortness of breath, and chest pain.     Output:       Bowels- active in all four quadrants. Voids spontaneously without   difficulty in the urinal at bedside. Last bowel movement 11/11/22, passing flatus.     Activity:       Pt up with assist of 1 with walker and gait belt.     Skin:   Left groin wound, abdominal wound, dressings CDI. Dressing change due at 1600.     Pain:       Has pain in the left groin and given 20mg Oxy PRN, IV Toradol, ands Robaxin. Patient spoke with physician today about being seen by pain management team. Pain medications updated.      CMS:       CMS and Neuro's are intact. Baseline numbness and tingling in right leg.      Dressing:       Left groin and abdominal incisional dressings are CDI.      Diet:       Pt is on a regular diet and appetite was good this shift.       LDA:       PIV is patent in the right forearm and saline locked.      Equipment:       IV pole, personal belongings     Plan:       Pt is able to make needs known and the call light is within the pt's reach. Continue to monitor.       Additional Info:
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
## 2022-11-16 NOTE — PLAN OF CARE
[id number]    Pt is AOx4 and able to make needs known. RLE numbness and tingling at baseline. Denies SOB, chest pain. Mild nausea present this morning. Zofran did not help, compazine given. Pt reported it was effective. Rated pain 3-4/10 this morning, primarily in L groin area. PRN pain med switched to PO dilaudid this shift. Pain managed primarily with scheduled meds. Voiding spontaneously, urinal at bedside. LBM 11/11. Bowel sounds present and scheduled bowel meds given. Scaly, Itching rash present on LUE. Hydrocortisone cream ordered and given this shift. Vaseline gauze and ABD dressings to abdomen and L groin. CDI, changed this shift. ABRAHAM CUEVAS.

## 2022-11-16 NOTE — PLAN OF CARE
/71 (BP Location: Left arm, Patient Position: Supine, Cuff Size: Adult Regular)   Pulse 51   Temp 97.7  F (36.5  C) (Oral)   Resp 16   Wt 123.3 kg (271 lb 13.2 oz)   SpO2 91%   BMI 36.87 kg/m      Pt. A&OX3  Denies Sob, chest pain, n/v  Dressing on Left groin wound CDI  Reports RLE numbness at baseline   Reports mild groin pain. Scheduled tylenol given  Pt voiding spontaneously, using bedside urinal  Pt has not been oob  Right PIV SL  No changes overnight

## 2022-11-17 ENCOUNTER — MYC MEDICAL ADVICE (OUTPATIENT)
Dept: DERMATOLOGY | Facility: CLINIC | Age: 62
End: 2022-11-17

## 2022-11-17 ENCOUNTER — APPOINTMENT (OUTPATIENT)
Dept: OCCUPATIONAL THERAPY | Facility: CLINIC | Age: 62
End: 2022-11-17
Attending: INTERNAL MEDICINE
Payer: COMMERCIAL

## 2022-11-17 ENCOUNTER — APPOINTMENT (OUTPATIENT)
Dept: PHYSICAL THERAPY | Facility: CLINIC | Age: 62
End: 2022-11-17
Attending: INTERNAL MEDICINE
Payer: COMMERCIAL

## 2022-11-17 LAB
ALBUMIN SERPL-MCNC: 2.8 G/DL (ref 3.4–5)
ALP SERPL-CCNC: 72 U/L (ref 40–150)
ALT SERPL W P-5'-P-CCNC: 18 U/L (ref 0–70)
ANION GAP SERPL CALCULATED.3IONS-SCNC: 6 MMOL/L (ref 3–14)
AST SERPL W P-5'-P-CCNC: 12 U/L (ref 0–45)
BILIRUB SERPL-MCNC: 0.3 MG/DL (ref 0.2–1.3)
BUN SERPL-MCNC: 22 MG/DL (ref 7–30)
CALCIUM SERPL-MCNC: 9.2 MG/DL (ref 8.5–10.1)
CHLORIDE BLD-SCNC: 106 MMOL/L (ref 94–109)
CO2 SERPL-SCNC: 26 MMOL/L (ref 20–32)
CREAT SERPL-MCNC: 0.64 MG/DL (ref 0.66–1.25)
ERYTHROCYTE [DISTWIDTH] IN BLOOD BY AUTOMATED COUNT: 14 % (ref 10–15)
GFR SERPL CREATININE-BSD FRML MDRD: >90 ML/MIN/1.73M2
GLUCOSE BLD-MCNC: 126 MG/DL (ref 70–99)
GLUCOSE BLDC GLUCOMTR-MCNC: 101 MG/DL (ref 70–99)
GLUCOSE BLDC GLUCOMTR-MCNC: 107 MG/DL (ref 70–99)
GLUCOSE BLDC GLUCOMTR-MCNC: 145 MG/DL (ref 70–99)
GLUCOSE BLDC GLUCOMTR-MCNC: 98 MG/DL (ref 70–99)
HCT VFR BLD AUTO: 36.5 % (ref 40–53)
HGB BLD-MCNC: 12 G/DL (ref 13.3–17.7)
MAGNESIUM SERPL-MCNC: 1.8 MG/DL (ref 1.6–2.3)
MCH RBC QN AUTO: 28 PG (ref 26.5–33)
MCHC RBC AUTO-ENTMCNC: 32.9 G/DL (ref 31.5–36.5)
MCV RBC AUTO: 85 FL (ref 78–100)
PLATELET # BLD AUTO: 278 10E3/UL (ref 150–450)
POTASSIUM BLD-SCNC: 4.4 MMOL/L (ref 3.4–5.3)
PROT SERPL-MCNC: 6.6 G/DL (ref 6.8–8.8)
RBC # BLD AUTO: 4.29 10E6/UL (ref 4.4–5.9)
SARS-COV-2 RNA RESP QL NAA+PROBE: NEGATIVE
SODIUM SERPL-SCNC: 138 MMOL/L (ref 133–144)
WBC # BLD AUTO: 8.3 10E3/UL (ref 4–11)

## 2022-11-17 PROCEDURE — 36415 COLL VENOUS BLD VENIPUNCTURE: CPT | Performed by: PHYSICIAN ASSISTANT

## 2022-11-17 PROCEDURE — 83735 ASSAY OF MAGNESIUM: CPT | Performed by: PHYSICIAN ASSISTANT

## 2022-11-17 PROCEDURE — 97535 SELF CARE MNGMENT TRAINING: CPT | Mod: GO

## 2022-11-17 PROCEDURE — 250N000013 HC RX MED GY IP 250 OP 250 PS 637: Performed by: PHYSICIAN ASSISTANT

## 2022-11-17 PROCEDURE — 97530 THERAPEUTIC ACTIVITIES: CPT | Mod: GP | Performed by: PHYSICAL THERAPIST

## 2022-11-17 PROCEDURE — 97110 THERAPEUTIC EXERCISES: CPT | Mod: GO

## 2022-11-17 PROCEDURE — 120N000002 HC R&B MED SURG/OB UMMC

## 2022-11-17 PROCEDURE — U0005 INFEC AGEN DETEC AMPLI PROBE: HCPCS | Performed by: PHYSICIAN ASSISTANT

## 2022-11-17 PROCEDURE — 97116 GAIT TRAINING THERAPY: CPT | Mod: GP | Performed by: PHYSICAL THERAPIST

## 2022-11-17 PROCEDURE — 99232 SBSQ HOSP IP/OBS MODERATE 35: CPT | Performed by: PHYSICIAN ASSISTANT

## 2022-11-17 PROCEDURE — 82040 ASSAY OF SERUM ALBUMIN: CPT | Performed by: PHYSICIAN ASSISTANT

## 2022-11-17 PROCEDURE — 85027 COMPLETE CBC AUTOMATED: CPT | Performed by: PHYSICIAN ASSISTANT

## 2022-11-17 PROCEDURE — 250N000011 HC RX IP 250 OP 636: Performed by: PHYSICIAN ASSISTANT

## 2022-11-17 PROCEDURE — 80053 COMPREHEN METABOLIC PANEL: CPT | Performed by: PHYSICIAN ASSISTANT

## 2022-11-17 RX ADMIN — INSULIN ASPART 1 UNITS: 100 INJECTION, SOLUTION INTRAVENOUS; SUBCUTANEOUS at 18:59

## 2022-11-17 RX ADMIN — KETOROLAC TROMETHAMINE 15 MG: 15 INJECTION, SOLUTION INTRAMUSCULAR; INTRAVENOUS at 02:35

## 2022-11-17 RX ADMIN — SENNOSIDES AND DOCUSATE SODIUM 2 TABLET: 50; 8.6 TABLET ORAL at 20:57

## 2022-11-17 RX ADMIN — ESCITALOPRAM 5 MG: 5 TABLET, FILM COATED ORAL at 08:01

## 2022-11-17 RX ADMIN — METHOCARBAMOL 500 MG: 500 TABLET ORAL at 07:59

## 2022-11-17 RX ADMIN — ATORVASTATIN CALCIUM 20 MG: 20 TABLET, FILM COATED ORAL at 20:58

## 2022-11-17 RX ADMIN — METFORMIN HYDROCHLORIDE 1000 MG: 500 TABLET, EXTENDED RELEASE ORAL at 17:13

## 2022-11-17 RX ADMIN — POLYETHYLENE GLYCOL 3350 17 G: 17 POWDER, FOR SOLUTION ORAL at 07:59

## 2022-11-17 RX ADMIN — GABAPENTIN 100 MG: 100 CAPSULE ORAL at 20:57

## 2022-11-17 RX ADMIN — LOSARTAN POTASSIUM 25 MG: 25 TABLET, FILM COATED ORAL at 08:02

## 2022-11-17 RX ADMIN — KETOROLAC TROMETHAMINE 15 MG: 15 INJECTION, SOLUTION INTRAMUSCULAR; INTRAVENOUS at 22:23

## 2022-11-17 RX ADMIN — HYDROMORPHONE HYDROCHLORIDE 4 MG: 2 TABLET ORAL at 11:05

## 2022-11-17 RX ADMIN — ACETAMINOPHEN 975 MG: 325 TABLET, FILM COATED ORAL at 11:06

## 2022-11-17 RX ADMIN — GABAPENTIN 100 MG: 100 CAPSULE ORAL at 08:00

## 2022-11-17 RX ADMIN — METHOCARBAMOL 500 MG: 500 TABLET ORAL at 17:13

## 2022-11-17 RX ADMIN — GABAPENTIN 100 MG: 100 CAPSULE ORAL at 14:11

## 2022-11-17 RX ADMIN — METHOCARBAMOL 500 MG: 500 TABLET ORAL at 20:57

## 2022-11-17 RX ADMIN — MAGNESIUM OXIDE TAB 400 MG (241.3 MG ELEMENTAL MG) 400 MG: 400 (241.3 MG) TAB at 08:00

## 2022-11-17 RX ADMIN — LOSARTAN POTASSIUM 25 MG: 25 TABLET, FILM COATED ORAL at 20:57

## 2022-11-17 RX ADMIN — TRAZODONE HYDROCHLORIDE 100 MG: 50 TABLET ORAL at 22:22

## 2022-11-17 RX ADMIN — SENNOSIDES AND DOCUSATE SODIUM 2 TABLET: 50; 8.6 TABLET ORAL at 08:00

## 2022-11-17 RX ADMIN — FLUTICASONE PROPIONATE 2 SPRAY: 50 SPRAY, METERED NASAL at 08:08

## 2022-11-17 RX ADMIN — KETOROLAC TROMETHAMINE 15 MG: 15 INJECTION, SOLUTION INTRAMUSCULAR; INTRAVENOUS at 17:14

## 2022-11-17 RX ADMIN — HYDROCORTISONE: 1 CREAM TOPICAL at 08:08

## 2022-11-17 RX ADMIN — ACETAMINOPHEN 975 MG: 325 TABLET, FILM COATED ORAL at 19:02

## 2022-11-17 RX ADMIN — HYDROCORTISONE: 1 CREAM TOPICAL at 21:02

## 2022-11-17 RX ADMIN — KETOROLAC TROMETHAMINE 15 MG: 15 INJECTION, SOLUTION INTRAMUSCULAR; INTRAVENOUS at 09:43

## 2022-11-17 RX ADMIN — METHOCARBAMOL 500 MG: 500 TABLET ORAL at 12:44

## 2022-11-17 RX ADMIN — METFORMIN HYDROCHLORIDE 1000 MG: 500 TABLET, EXTENDED RELEASE ORAL at 08:01

## 2022-11-17 RX ADMIN — POLYETHYLENE GLYCOL 3350 17 G: 17 POWDER, FOR SOLUTION ORAL at 20:58

## 2022-11-17 RX ADMIN — TRAZODONE HYDROCHLORIDE 100 MG: 50 TABLET ORAL at 02:35

## 2022-11-17 ASSESSMENT — ACTIVITIES OF DAILY LIVING (ADL)
ADLS_ACUITY_SCORE: 37
ADLS_ACUITY_SCORE: 38
ADLS_ACUITY_SCORE: 38
ADLS_ACUITY_SCORE: 37
ADLS_ACUITY_SCORE: 36
ADLS_ACUITY_SCORE: 38
ADLS_ACUITY_SCORE: 37
ADLS_ACUITY_SCORE: 38
ADLS_ACUITY_SCORE: 38
ADLS_ACUITY_SCORE: 37

## 2022-11-17 NOTE — PROGRESS NOTES
11/16/22 1125   Appointment Info   Signing Clinician's Name / Credentials (PT) CHERRIE Salas   Student Supervision On-site supervision provided;Therapy services provided with the co-signing licensed therapist guiding and directing the services, and providing the skilled judgement and assessment throughout the session;Direct Patient Contact Provided       Present no   Language English   Living Environment   People in Home alone   Current Living Arrangements house   Home Accessibility stairs to enter home   Number of Stairs, Main Entrance 5   Stair Railings, Main Entrance railings safe and in good condition;railings on both sides of stairs   Transportation Anticipated family or friend will provide   Living Environment Comments Pt lives on one level, walk in shower.   Self-Care   Usual Activity Tolerance moderate   Current Activity Tolerance good   Regular Exercise No   Equipment Currently Used at Home cane, straight   Fall history within last six months yes   Number of times patient has fallen within last six months 3   Activity/Exercise/Self-Care Comment Only 1 fall since getting cane in May. Falls occured going up or down stairs. 11 falls prior to getting cane within the last year. Able to go shopping and get around home okay before biopsy.   General Information   Onset of Illness/Injury or Date of Surgery 11/11/22   Referring Physician Rocio Mitchell MD   Patient/Family Therapy Goals Statement (PT) Hoping to get biopsy taken care of in order to have surgery on back.   Pertinent History of Current Problem (include personal factors and/or comorbidities that impact the POC) Pt is a 62 year old male admitted on 11/11/2022. He has PMH significant for HTN, HLD, T2DM, Depression, Insomnia, PTSD, seasonal allergic rhinitis and large BCC of left upper thigh and scrotum s/p MOHS surgery with flap closure (11/11/22).   Existing Precautions/Restrictions fall   Weight-Bearing  Status - LLE weight-bearing as tolerated   Weight-Bearing Status - RLE full weight-bearing   Heart Disease Risk Factors Overweight   General Observations Pt is agreeable to PT, talkative   Cognition   Affect/Mental Status (Cognition) WNL   Orientation Status (Cognition) oriented x 3   Follows Commands (Cognition) WNL   Pain Assessment   Patient Currently in Pain Yes, see Vital Sign flowsheet  (Pain with WB on L LE)   Integumentary/Edema   Integumentary/Edema other (describe)   Integumentary/Edema Comments Incision not observed due to dressing covering   Posture    Posture Forward head position;Protracted shoulders   Range of Motion (ROM)   Range of Motion ROM is WFL   Strength (Manual Muscle Testing)   Strength (Manual Muscle Testing) Able to perform L SLR;Able to perform R SLR;strength is WFL   Strength Comments Pt able to ambulate approximately 1000 ft with FWW, able to bear weight appropriately through BLE during ambulation and stairs   Bed Mobility   Bed Mobility no deficits identified;supine-sit;sit-supine   Supine-Sit [place] (Bed Mobility) supervision   Sit-Supine [place] (Bed Mobility) supervision   Comment, (Bed Mobility) Pt able to complete supine <> sit transfer with no physical assistance or cueing   Transfers   Transfers sit-stand transfer   Sit-Stand Transfer   Sit-Stand [place] (Transfers) supervision   Assistive Device (Sit-Stand Transfers) walker, front-wheeled   Comment, (Sit-Stand Transfer) Cueing not required for proper hand placement   Gait/Stairs (Locomotion)   [place] Level (Gait) contact guard   Assistive Device (Gait) walker, front-wheeled   Distance in Feet (Required for LE Total Joints) 10   Pattern (Gait) step-through   Deviations/Abnormal Patterns (Gait) weight shifting decreased;antalgic   Maintains Weight-bearing Status (Gait) able to maintain   Balance   Balance no deficits were identified   Sensory Examination   Sensory Perception other (describe)   Sensory  "Perception Comments Pt reports that with previous falls prior to admission, he feels like \"his left leg isn't there,\" but does not report sensory changes during ambulation or ADLs. Pt experiences burning in BLE after ambulation currently. \   Coordination   Coordination no deficits were identified   Muscle Tone   Muscle Tone no deficits were identified   Clinical Impression   Criteria for Skilled Therapeutic Intervention Yes, treatment indicated   PT Diagnosis (PT) LE sensory deficits secondary to biopsy, BLE global strength deficits   Influenced by the following impairments s/p biopsy   Functional limitations due to impairments stairs, gait, transfers, bed mobility   Clinical Presentation (PT Evaluation Complexity) Evolving/Changing   Clinical Presentation Rationale Pt currently exhibiting additional neurogenic pain due to biopsy, exhibits neuropathic pain at baseline due to back pain and sciatic nerve pain.   Clinical Decision Making (Complexity) low complexity   Planned Therapy Interventions (PT) balance training;gait training;bed mobility training;stair training;strengthening;transfer training   Anticipated Equipment Needs at Discharge (PT) walker, rolling   Risk & Benefits of therapy have been explained evaluation/treatment results reviewed;care plan/treatment goals reviewed;risks/benefits reviewed;patient;daughter   Clinical Impression Comments Pt is close to baseline for mobility, however, requiring supervision for gait and stairs due to pain medication and lightheadedness. Would require that these symptoms subside prior to d/c as pt lives alone.   PT Total Evaluation Time   PT Eval, Low Complexity Minutes (71712) 10   Plan of Care Review   Plan of Care Reviewed With patient;daughter   Physical Therapy Goals   PT Frequency Daily   PT Predicted Duration/Target Date for Goal Attainment 11/17/22   PT Goals Bed Mobility;Transfers;Gait;Stairs   PT: Bed Mobility Independent;Supine to/from sit   PT: Transfers " Modified independent;Sit to/from stand;Assistive device   PT: Gait Modified independent;Assistive device;Rolling walker;Greater than 200 feet   PT: Stairs Independent;5 stairs;Rail on both sides   Interventions   Interventions Quick Adds Gait Training;Therapeutic Activity   Therapeutic Activity   Therapeutic Activities: dynamic activities to improve functional performance Minutes (77433) 5   Symptoms Noted During/After Treatment None   Treatment Detail/Skilled Intervention PT: Arrived to pt room to pt in supine, agreeable to PT. Supine <> sit x1 with SBA. Sit <> stand x2 with SBA and FWW, no cueing required for proper hand placement. Pt left at end of session with daughter in room and call light and essential items within reach.   Gait Training   Gait Training Minutes (27815) 30   Symptoms Noted During/After Treatment (Gait Training) fatigue;dizziness   Treatment Detail/Skilled Intervention PT: Pt ambulates around 500 ft to therapy gym, SBA with FWW. Pt takes short steps and exhibits ER of hips bilaterally. Pt cued to keep toes pointed straight forward during gait. Pt additionally cued to keep FWW directly under him to priovide greater support during ambulation. Pt takes seated rest break in therapy gym, stating that he feels lightheaded. Seated rest break taken for approximately 3 minutes to allow pt to recover. Four stairs ascended/descended x1 with CGA and use of 2 handrails. Pt educated on importance of leading with R leg while ascending, and leading with left leg while descending stairs. Pt appears less steady with first step and becomes more comfortable with more steps. Pt ambulates back to room from therapy gym, approximately 500 ft SBA with FWW.   PT Discharge Planning   PT Plan Review stairs with pt tomorrow. Pt will need ot be indep with all mobility in order to dc as he lives alone. Speak with pt to see if pt's daughter would be able to stay with him for short period of time   PT Discharge Recommendation  (DC Rec) home;home with assist   PT Rationale for DC Rec Primary recommendation for pt is home. In order to d/c to home, pt will need to be independent with all ADLs. Pt was previously at risk of falling due neuropathic pain due to sciatic nerve impingement, and is further at increased risk of falls due to current pain medication management after biopsy. Pt previously used SPC to ambulate around home, but it is recommended that he use FWW to improve stability. Pt is currently stable on stairs and gait but is currently limited by lightheadedness, likely due to pain medications. Pt will be able to dc sooner if he is able to go home with assist from daughter.   PT Brief overview of current status SBA bed mobility, SBA STS, SBA gait, CGA stairs   Total Session Time   Timed Code Treatment Minutes 35   Total Session Time (sum of timed and untimed services) 45

## 2022-11-17 NOTE — PLAN OF CARE
Goal Outcome Evaluation:       AxOx4. Cont of B/B LBM 11/11. Increased bowel meds given last evening, bowel sounds present and pt states he is passing gas. Pain managed with scheduled Tylenol and Toradol. Bg stable. Sutures to L groin and lower L abdomen CDI. Pt is able to use call light appropriately. Continue POC.
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
## 2022-11-17 NOTE — TELEPHONE ENCOUNTER
Health Call Center    Phone Message    May a detailed message be left on voicemail: yes     Reason for Call: Other: Patient is still in hospital and is wondering why he is being called. Patient is supposed to have a resident change his bandage for him at the hospital today. Patient also states he was told all cancer was not removed - was the call about getting the rest removed?   Please call back  Thank you    Action Taken: Message routed to:  [facility] ([facility] Derm    Travel Screening: Not Applicable

## 2022-11-17 NOTE — PLAN OF CARE
Occupational Therapy Discharge Summary    Reason for therapy discharge:    All goals and outcomes met, no further needs identified.    Progress towards therapy goal(s). See goals on Care Plan in [facility] electronic health record for goal details.  Goals met    Therapy recommendation(s):    No further therapy is recommended.  Continue home exercise program.

## 2022-11-17 NOTE — PLAN OF CARE
Goal Outcome Evaluation:    VSS. RA. A/Ox4. Pt states he sometimes gets disoriented in the night.Denies SOB, chest pain, n/v, L groin, scrotom and abdominal dressing changed this AM, baseline numbness in RLE, moderate pain this AM controlled with scheduled pain medications, and PRN dilaudid given once this AM. Voiding spontaneously, BM this AM, Up assist of 1 with walker and gait belt. R PIV saline locked. Checking CBC, BMP and electrolytes. Monitoring for wound dehiscence. Minimal bleeding after up to bathroom.

## 2022-11-18 ENCOUNTER — TELEPHONE (OUTPATIENT)
Dept: DERMATOLOGY | Facility: CLINIC | Age: 62
End: 2022-11-18

## 2022-11-18 VITALS
WEIGHT: 271.83 LBS | HEART RATE: 63 BPM | OXYGEN SATURATION: 95 % | TEMPERATURE: 98 F | BODY MASS INDEX: 36.87 KG/M2 | SYSTOLIC BLOOD PRESSURE: 118 MMHG | RESPIRATION RATE: 16 BRPM | DIASTOLIC BLOOD PRESSURE: 63 MMHG

## 2022-11-18 LAB
GLUCOSE BLDC GLUCOMTR-MCNC: 109 MG/DL (ref 70–99)
GLUCOSE BLDC GLUCOMTR-MCNC: 122 MG/DL (ref 70–99)

## 2022-11-18 PROCEDURE — 99239 HOSP IP/OBS DSCHRG MGMT >30: CPT | Performed by: PHYSICIAN ASSISTANT

## 2022-11-18 PROCEDURE — 250N000013 HC RX MED GY IP 250 OP 250 PS 637: Performed by: PHYSICIAN ASSISTANT

## 2022-11-18 PROCEDURE — 250N000011 HC RX IP 250 OP 636: Performed by: PHYSICIAN ASSISTANT

## 2022-11-18 RX ORDER — GABAPENTIN 100 MG/1
100 CAPSULE ORAL 3 TIMES DAILY
Qty: 90 CAPSULE | Refills: 0 | Status: SHIPPED | OUTPATIENT
Start: 2022-11-18

## 2022-11-18 RX ORDER — ACETAMINOPHEN 325 MG/1
975 TABLET ORAL EVERY 8 HOURS
Qty: 90 TABLET | Refills: 0 | Status: SHIPPED | OUTPATIENT
Start: 2022-11-18

## 2022-11-18 RX ORDER — POLYETHYLENE GLYCOL 3350 17 G/17G
17 POWDER, FOR SOLUTION ORAL DAILY
Qty: 510 G | Refills: 0 | Status: SHIPPED | OUTPATIENT
Start: 2022-11-18

## 2022-11-18 RX ORDER — ONDANSETRON 4 MG/1
4 TABLET, ORALLY DISINTEGRATING ORAL EVERY 6 HOURS PRN
Qty: 10 TABLET | Refills: 0 | Status: SHIPPED | OUTPATIENT
Start: 2022-11-18

## 2022-11-18 RX ORDER — AMOXICILLIN 250 MG
2 CAPSULE ORAL 2 TIMES DAILY
Qty: 60 TABLET | Refills: 0 | Status: SHIPPED | OUTPATIENT
Start: 2022-11-18

## 2022-11-18 RX ORDER — BENZOCAINE/MENTHOL 6 MG-10 MG
LOZENGE MUCOUS MEMBRANE 2 TIMES DAILY
Qty: 14 G | Refills: 0 | Status: SHIPPED | OUTPATIENT
Start: 2022-11-18

## 2022-11-18 RX ORDER — METHOCARBAMOL 500 MG/1
500 TABLET, FILM COATED ORAL 4 TIMES DAILY PRN
Qty: 56 TABLET | Refills: 0 | Status: SHIPPED | OUTPATIENT
Start: 2022-11-18

## 2022-11-18 RX ORDER — IBUPROFEN 600 MG/1
600 TABLET, FILM COATED ORAL EVERY 6 HOURS PRN
COMMUNITY
Start: 2022-11-18

## 2022-11-18 RX ORDER — HYDROMORPHONE HYDROCHLORIDE 2 MG/1
2-4 TABLET ORAL
Qty: 10 TABLET | Refills: 0 | Status: SHIPPED | OUTPATIENT
Start: 2022-11-18

## 2022-11-18 RX ADMIN — GABAPENTIN 100 MG: 100 CAPSULE ORAL at 08:33

## 2022-11-18 RX ADMIN — FLUTICASONE PROPIONATE 2 SPRAY: 50 SPRAY, METERED NASAL at 08:33

## 2022-11-18 RX ADMIN — MAGNESIUM OXIDE TAB 400 MG (241.3 MG ELEMENTAL MG) 400 MG: 400 (241.3 MG) TAB at 08:32

## 2022-11-18 RX ADMIN — METFORMIN HYDROCHLORIDE 1000 MG: 500 TABLET, EXTENDED RELEASE ORAL at 08:31

## 2022-11-18 RX ADMIN — METHOCARBAMOL 500 MG: 500 TABLET ORAL at 11:05

## 2022-11-18 RX ADMIN — HYDROCORTISONE: 1 CREAM TOPICAL at 08:33

## 2022-11-18 RX ADMIN — KETOROLAC TROMETHAMINE 15 MG: 15 INJECTION, SOLUTION INTRAMUSCULAR; INTRAVENOUS at 12:17

## 2022-11-18 RX ADMIN — LOSARTAN POTASSIUM 25 MG: 25 TABLET, FILM COATED ORAL at 08:32

## 2022-11-18 RX ADMIN — ACETAMINOPHEN 975 MG: 325 TABLET, FILM COATED ORAL at 11:03

## 2022-11-18 RX ADMIN — ESCITALOPRAM 5 MG: 5 TABLET, FILM COATED ORAL at 08:33

## 2022-11-18 RX ADMIN — GABAPENTIN 100 MG: 100 CAPSULE ORAL at 14:42

## 2022-11-18 RX ADMIN — METHOCARBAMOL 500 MG: 500 TABLET ORAL at 16:08

## 2022-11-18 RX ADMIN — ACETAMINOPHEN 975 MG: 325 TABLET, FILM COATED ORAL at 06:27

## 2022-11-18 RX ADMIN — METHOCARBAMOL 500 MG: 500 TABLET ORAL at 08:32

## 2022-11-18 RX ADMIN — KETOROLAC TROMETHAMINE 15 MG: 15 INJECTION, SOLUTION INTRAMUSCULAR; INTRAVENOUS at 06:27

## 2022-11-18 ASSESSMENT — ACTIVITIES OF DAILY LIVING (ADL)
DEPENDENT_IADLS:: INDEPENDENT
ADLS_ACUITY_SCORE: 37
ADLS_ACUITY_SCORE: 38
ADLS_ACUITY_SCORE: 37
ADLS_ACUITY_SCORE: 38
ADLS_ACUITY_SCORE: 36
ADLS_ACUITY_SCORE: 37
ADLS_ACUITY_SCORE: 38

## 2022-11-18 NOTE — PLAN OF CARE
Progress Note:  Patient A&O times 4; VSS; c/o scrotal pain and tolerated scheduled pain med with relief with no need for PRN pain med. Good appetite and sliding scale insulin per BG parameter.Dressings CD&I. Patient states that he ambulated twice today with PT and is voiding good amounts, passing flatus and had a BM today. Verbalized numbness and tingling in right leg resulting from his sciatica. Saline lock patent and intact to right hand. Continue with patient care and pain assessment.

## 2022-11-18 NOTE — TELEPHONE ENCOUNTER
The call is about his suture removal appointment.  There is a small area of BCC (this has been explained to him in person) left.  That can wait to come out till after this is healed up and he's had his back surgery.

## 2022-11-18 NOTE — TELEPHONE ENCOUNTER
Writer called patient back to let him know that the phone call was in regards to setting up an appointment to have his sutures removed, patient said he was told this and told to set up an appointment for Friday November 25th. Patient was transferred over to our  to set this up.    Writer also explained to patient that the rest of the BCC will be removed once he heals from this last procedure and has his back surgery. Patient acknowledged.    Debra ROSARIO RN
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
## 2022-11-18 NOTE — DISCHARGE INSTRUCTIONS
Standard Sutures and Flaps:    I will experience scar, bleeding, swelling, pain, crusting and redness. I may experience incomplete removal or recurrence. Risks are bleeding, bruising, infection, nerve damage, & large wound. A second procedure may be recommended to obtain the best cosmetic or functional result.    Information about caring for your wound:  Relax and take it easy. Do not do any vigorous exercise or heavy lifting. This could cause the wound to bleed.  Post-Operative pain is usually mild. You may take Tylenol (acetaminophen) 1000 mg every 6 hours as needed. Do not take any medication that contains aspirin. If needed and you are allowed to take ibuprofen, you can alternate the Tylenol with ibuprofen 400 mg every 6 hours.  You may put an ice pack around the bandaged area for 20 minutes at a time as needed. This may help reduce swelling, bruising, and pain. Make sure the ice pack is waterproof so that the pressure bandage doesn t get wet.  You may see a small amount of drainage or blood on your pressure bandage. This is normal. However:  If drainage or bleeding continues or saturates the bandage, you will need to apply firm pressure over the bandage with a piece of gauze for 15 minutes.  If bleeding continues after applying pressure for 15 minutes, apply an ice pack to the bandaged area for 15 minutes.  If bleeding still continues, call our office or go to the nearest emergency room.    Wound care instructions for when you leave the hospital:    After 24 hours, carefully remove the bandage that was placed. If it seems sticky or too difficult to get off, you may need to soak it off in the shower.  After the pressure dressing is removed, you may shower and get the wound wet. However, Do Not let the forceful stream of the shower hit the wound directly.  Follow these wound care and dressing change instructions:  Do Not scrub the suture site. Apply a gentle cleanser to the suture site with a clean cloth but do  not scrub. You may allow water to run over the site to rinse.  After site is clean pat dry and apply a thin layer of Vaseline ointment  over the suture site with a clean cotton swab.  Cover the suture site with a (non-stick) dressing. You may tape a piece of gauze over the dressing for extra protection if you wish. Do this for both the site in the groin and on the abdomen. This bandage will stay on for 24 hours.  Continue the wound care and dressing changes every day until you come back. You will return to our clinic on Fri, 11/25. The sutures on the abdomen will be removed at that time and we will provide you with additional wound care supplies.    What to expect:  The first couple of days your wound may be tender and may bleed slightly when doing wound care.  There may be swelling and bruising around the wound, especially if it is near the eyes. For your comfort, you may apply ice or cold compresses to the bruises after your have removed the pressure bandage.  The area around your wound may be numb for several weeks or even months.  You may experience periodic sharp pain or mild itching around the wound as it heals.   The suture line will look dark pink at first and the edges of the wound will be reddened. This will lighten up each day.    When to call us:  You have bleeding that will not stop after applying pressure and ice.  You have pain that is not controlled with Tylenol (acetaminophen.)  You have signs or symptoms of an infection such as:  Fever over 100 degrees Fahrenheit  Redness, warmth or foul-smelling drainage from the wound  If you have any questions, or are not sure how to take care of the wound.  Phone numbers:  During business hours (M-F 8:00-4:30 p.m.)  [facility]-  [phone] Option 1 appt. desk  [phone]  Option 3 nurse triage line  ---------------------------------------------------------  Evenings/Weekends/Holidays  Hospital - [phone]   TTY for  "hearing hchdljpz-[phone]  *Ask  to page dermatologist on-call  Emergency Rslj-[phone]  TTY for hearing impaired- [phone]    Information for your follow up appointment  You are scheduled to return to see us on Fri, 11/25 at 1:30 PM. The sutures will be removed at that time and we will provide you with additional wound care instructions and wound care supplies.  Address for the clinic:  [facility] Building Name: \"[facility],\" also known as the [facility]  [address], [place], [place], [address]  We are located on the third floor of this building. Patients check in at the kiosk there on the third floor main atrium, then a member of our nursing staff will call your name and escort you to the patient room after you have checked in.  Parking information:  There is street parking, as well as a parking garage diagonal to the building located on [facility], called \"[facility].\"   If you decide to park your car in this garage, there are two entrances for the [address] (i.e, one on each side of the parking garage):   One is near the corner of [facility] and [facility]; and the other is near the corner of [facility] [address] [facility]. Ample parking marked \"patient parking\" is typically available on the ground level of this parking garage.  Many patients  their car and say the fee to  is reasonable and makes the visit a little less stressful; however, this is up to your discretion. At one point in 2022, it was $7 a day, but this could always be subject to change and may be increased. The  is located at the main entrance of the Clinics and Surgery Center ([facility] on [address] [facility].        "

## 2022-11-18 NOTE — TELEPHONE ENCOUNTER
Called and spoke to pt. Scheduled 2 week wound check telephone consult per Dr. Bourne and Dr. Hernandez.     Vera Jung, Procedure  11/18/2022 3:37 PM

## 2022-11-18 NOTE — TELEPHONE ENCOUNTER
Pt scheduled for a nurse appt to remove sutures on 11/25 at 1:30pm.    Vera Jung, Procedure  11/18/2022 10:13 AM

## 2022-11-18 NOTE — CONSULTS
Care Management Initial Consult    General Information  Assessment completed with: Patient,    Type of CM/SW Visit: Initial Assessment    Primary Care Provider verified and updated as needed: Yes (updated epic)   Readmission within the last 30 days: no previous admission in last 30 days   Reason for Consult: discharge planning  Advance Care Planning:            Communication Assessment  Patient's communication style: spoken language (English or Bilingual)        Cognitive  Cognitive/Neuro/Behavioral: WDL                      Living Environment:   People in home: alone     Current living Arrangements: house (1 level)      Able to return to prior arrangements: yes       Family/Social Support:  Care provided by: self  Provides care for: no one  Marital Status: Single   Description of Support System:  (Son and daughter live around 15 minutes away). 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.)     Financial Concerns:        Lifestyle & Psychosocial Needs:  Social Determinants of Health     Tobacco Use: Medium Risk     Smoking Tobacco Use: Former     Smokeless Tobacco Use: Never     Passive Exposure: Not on file   Alcohol Use: Not on file   Financial Resource Strain: Not on file   Food Insecurity: Not on file   Transportation Needs: Not on file   Physical Activity: Not on file   Stress: Not on file   Social Connections: Not on file   Intimate Partner Violence: Not on file   Depression: Not at risk     PHQ-2 Score: 1   Housing Stability: Not on file       Functional Status:  Prior to admission patient needed assistance:   Dependent ADLs:: Independent  Dependent IADLs:: Independent       Mental Health Status:  Chemical Dependency  "Status:  Values/Beliefs:  Spiritual, Cultural Beliefs, Muslim Practices, Values that affect care:               Discharge Note    Discharge Date: 11/18/2022     Discharge Disposition: Home (outpatient follow up for wound)    Discharge Services: None    Discharge DME: None    Discharge Transportation: family or friend will provide    Private pay costs discussed: Not applicable    Education Provided on the Discharge Plan: yes  Persons Notified of Discharge Plans: patient  Patient/Family in Agreement with the Plan: yes    Handoff Referral Completed: No (no new services or DME. Does not have a high risk readmission score).     Additional Information:  Per notes, patient \"is a 62 year old male admitted on 11/11/2022. He has PMH significant for HTN, HLD, T2DM, Depression, Insomnia, PTSD, seasonal allergic rhinitis and large BCC of left upper thigh and scrotum s/p MOHS surgery with flap closure (11/11/22). He was admitted from [facility] due to difficulties controlling his pain post-operatively\".     Writer and FRANCESCA BoucherCC met with patient to complete initial assessment and discuss discharge planning. Patient lives at home alone. He was previously doing his own wound care independently and following up outpatient 2x/month. He has wound care supplies at home but cou'dn't remember the name of the company he gets them through. Patient verbalized that he is confident he can do his wound care independently at discharge and would like to continue to follow up outpatient. Offered home care but he declined the need at this time. Patient explained that the wound care that is being provided here does not work as well as what he was doing at home. Paged dermatology to see if he can go back to doing what he was doing prior to this hospitalization when he gets home. Patient stated that he has a ride home at discharge.     Per dermatology note on 11/7/22, \"As he was performing his own wound care on his 16+cm BCC ulcer prior to surgery I " "believe him to be fully capable of performing wound care on his sutures once discharge\".      2:05 PM  Spoke to dermatology team who confirmed that patient can resume what he was doing for wound care when he returns home. Updated patient who verbalized understanding and agreement to plan. Dermatology stated that they will be putting instructions on his AVS.     WILFRID Nelson RNCC  RN Care Coordinator   Office: [phone]   Pager: [phone]      "

## 2022-11-18 NOTE — PROGRESS NOTES
DERM BRIEF INPATIENT PROGRESS NOTE:     62 year old man now day 7 post op s/p MMS to large BCC left groin/scrotum s/p same day recon with large abdominal FTSG. Patient today complains of pain and bleeding. Has needed PRN meds for pain. This morning went to ambulate with OT and had to use restroom. At that point patient complains of substantial bleeding from surgical site which alarmed the patient. The bleeding responded quickly after reapplying dressing by nurses. Discussed that this is common post operatively, particularly in a location such as this which is under tension particularly when taking NSAIDs, IV toradol, etc. Reassured patient that it responded appropriately to pressure and options at this point include pressure vs opening the skin graft and attempting to locate an oozing vessel, which he agreed was not preferred course.     On exam dressing CDI, well-perfused skin graft covering left groin/scrotum, no erythema, warmth, purulence or drainage.  Abdomen donor site healing well.     - Discussed with RN that we recommend patient be discharged home tomorrow with PO pain meds.    - Information sent to schedulers for suture removal in 1 week.   - Will follow up for small positive peripheral margin after skin graft has healed and after patient has recovered from his back surgery.       Nathalia Walker MD     Patient staffed with Dr. Hernandez.     Photos reviewed and patient staffed with resident.  He is able to be dc'd from a derm surg perspective.  Due to the areas in the upper thigh that were sutured some minor bleeding is expected in the first couple of weeks with ambulation.  As he was performing his own wound care on his 16+cm BCC ulcer prior to surgery I believe him to be fully capable of performing wound care on his sutures once discharge.    Clifford Hernandez MD

## 2022-11-18 NOTE — PLAN OF CARE
Goal Outcome Evaluation:       AxOx4. Cont of B/B LBM 11/17.  Pain managed with scheduled Tylenol and Toradol. Bg stable. Pt is able to use call light appropriately. Continue POC.

## 2022-11-25 ENCOUNTER — TELEPHONE (OUTPATIENT)
Dept: DERMATOLOGY | Facility: CLINIC | Age: 62
End: 2022-11-25

## 2022-11-25 NOTE — TELEPHONE ENCOUNTER
I called and spoke with the patient in regard to his appointment today. I asked the patient is he has any concerns with his surgical site. Patient states there is some drainage at the surgical site and would like the doctor to assess. I advised the patient that Dr. Hernandez is not in office today and if anyone had informed him of this. Patient said no one told him Dr. Hernandez would not be in today. We agreed to have him come in on Monday at 1 pm to have the sutures out and site looked at.     Shira DUVAL CMA

## 2022-11-26 ENCOUNTER — DOCUMENTATION ONLY (OUTPATIENT)
Dept: DERMATOLOGY | Facility: CLINIC | Age: 62
End: 2022-11-26

## 2022-11-26 NOTE — PROGRESS NOTES
"Patient paged with regards to foul odor from the wound site. Patient had a procedure done 11/11/2022 for BCC of the left leg/thigh. Patient reports that he has noticed increased odor to the point that it is making his nauseaous. The patient denies an acute increase in pain and states that the pain is well managed on Tylenol alone. The patient states that the drainage is clear with \"speckles,\" but notices that when he changes the bandages it appears black and is odorous. The patient states that the swelling has persisted but has not acutely worsened.     Advised the patient to send photos to my chart as well as continue with Tylenol for pain management, as well as ice packs as needed. Advised the patient that if any acute worsening of symptoms to go to the local emergency department for further evaluation. Reminded patient about his upcoming appointment Monday 11/28/22.     Karissa Wheeler MD  Dermatology Resident, PGY-2  "

## 2022-11-28 ENCOUNTER — ALLIED HEALTH/NURSE VISIT (OUTPATIENT)
Dept: DERMATOLOGY | Facility: CLINIC | Age: 62
End: 2022-11-28
Payer: COMMERCIAL

## 2022-11-28 ENCOUNTER — TELEPHONE (OUTPATIENT)
Dept: DERMATOLOGY | Facility: CLINIC | Age: 62
End: 2022-11-28

## 2022-11-28 ENCOUNTER — TELEPHONE (OUTPATIENT)
Dept: WOUND CARE | Facility: CLINIC | Age: 62
End: 2022-11-28

## 2022-11-28 DIAGNOSIS — Z48.89 ENCOUNTER FOR POSTOPERATIVE WOUND CHECK: ICD-10-CM

## 2022-11-28 DIAGNOSIS — C44.719 BASAL CELL CARCINOMA (BCC) OF LEFT THIGH: Primary | ICD-10-CM

## 2022-11-28 PROCEDURE — 99207 PR SATISFY VISIT NUMBER: CPT | Mod: GC | Performed by: DERMATOLOGY

## 2022-11-28 NOTE — Clinical Note
Quality 130: Documentation Of Current Medications In The Medical Record: Current Medications Documented This was a nurse visit that you and I ended up seeing if you don't mind adding your addendum and closing. Thanks! Quality 47: Advance Care Plan: Advance Care Planning discussed and documented in the medical record; patient did not wish or was not able to name a surrogate decision maker or provide an advance care plan. Quality 131: Pain Assessment And Follow-Up: Pain assessment using a standardized tool is documented as negative, no follow-up plan required Detail Level: Detailed Quality 226: Preventive Care And Screening: Tobacco Use: Screening And Cessation Intervention: Patient screened for tobacco and is an ex-smoker Quality 110: Preventive Care And Screening: Influenza Immunization: Influenza Immunization previously received during influenza season Quality 111:Pneumonia Vaccination Status For Older Adults: Pneumococcal Vaccination Previously Received

## 2022-11-28 NOTE — TELEPHONE ENCOUNTER
Patient's [facility], [place], called to cancel patient's 11/28 appointment stating the patient recently has Mohse surgery and experienced complications from that leading to a week long hospital stay. He is due to have his sutures removed today and so he and his [facility] worker felt the appointment with Dr Nicole today was not necessary. Patient will be folowing up with Dr Hernandez next week and with Dr Nicole on 12/26 [place]  [phone]

## 2022-11-28 NOTE — TELEPHONE ENCOUNTER
[facility] Call Center    Phone Message    May a detailed message be left on voicemail: no     Reason for Call: Form or Letter   Type or form/letter needing completion: Work Restrictions updated to 12/26/22 when the Pt's PCP will be taking over.   Provider: Dr. Clifford Hernandez  Date form needed: Today -  [place] for Pt's workers comp states they need updated work restrictions at today's suture removal visit. [place] states Dr. Hernandez had scheduled the visit as a nurse only, but that he would be there also.   Once completed: Fax form to: [phone]      Action Taken: Message routed to:  [facility] Derm Surg    Travel Screening: Not Applicable

## 2022-11-29 NOTE — PROGRESS NOTES
Dermatologic Surgery Clinic Note    Nov 28, 2022    Dermatology Problem List:  1. History of NMSC  - BCC, L groin, s/p MMS in OR 11/11/22    Subjective: The patient is a 62 year old man who presents today for a wound check after recent dermatologic surgery. No concerns for infection today. The patient continues with daily wound cares as recommended.    Patient reports significant pain along incision in groin, which has been stable to gradually improving. Pain controlled with OTC analgesic medications. Patient reports bothersome odor. Denies concerns at graft donor site on lower abdomen. He presents for suture removal today.    No other associated symptoms, modifying factors, or prior treatments, except when noted above. The patient denies any constitutional symptoms, lymphadenopathy, unintentional weight loss or decreased appetite. No other skin concerns today.    Objective:   Gen: This is a well appearing individual in no acute distress. The patient is alert and oriented x 3.  An exam of the abdomen and groin was performed today and visualized the following:  - Well-approximated, healing linear incision with minimal erythema present on lower abdomen  - Healing graft with superficial desquamation of entire graft area present in L groin. Mild surrounding erythema without purulence. Significant odor present  - The surgical site noted above is clean, dry, and intact. There is no surrounding erythema, purulence, or significant tenderness to palpation. No clinical evidence of infection noted today.    Assessment and Plan:     - The patient's surgery site(s) is/are healing very well. No evidence of infection on examination today.  - Prolene top sutures of FTSG donor site removed today  - Graft undergoing superficial desquamation with secondary bacterial colonization of devitalized tissue. Non-viable tissue of graft gently debrided today  - The patient was told to continue with wound cares until the area(s) is/are no  longer crusted. Emphasized importance of washing surgical sites daily  - The patient should follow up with dermatologic surgery in 1 week, as well as continue with regular skin exams in general dermatology clinic.    The patient was discussed with and evaluated by attending physician, Clifford Hernandez MD.    Felipe Bourne MD  Dermatology, PGY-5  Mohs surgery fellow

## 2022-11-30 NOTE — TELEPHONE ENCOUNTER
Writer talked to Kurt and he is seeing his  today and will have her send us the forms that are needed.    KELLI Parson

## 2022-12-05 ENCOUNTER — OFFICE VISIT (OUTPATIENT)
Dept: DERMATOLOGY | Facility: CLINIC | Age: 62
End: 2022-12-05
Payer: COMMERCIAL

## 2022-12-05 DIAGNOSIS — C44.719 BASAL CELL CARCINOMA (BCC) OF LEFT THIGH: Primary | ICD-10-CM

## 2022-12-05 DIAGNOSIS — Z48.89 ENCOUNTER FOR POSTOPERATIVE WOUND CHECK: ICD-10-CM

## 2022-12-05 PROCEDURE — 99024 POSTOP FOLLOW-UP VISIT: CPT | Mod: GC | Performed by: DERMATOLOGY

## 2022-12-05 ASSESSMENT — PAIN SCALES - GENERAL: PAINLEVEL: MILD PAIN (2)

## 2022-12-05 NOTE — LETTER
12/5/2022       RE: Fish Abad  [address] [address] [address]     Dear Colleague,    Thank you for referring your patient, Fish Abad, to the [facility] [place] at [facility]. Please see a copy of my visit note below.    Dermatologic Surgery Clinic Note    Dec 5, 2022    Dermatology Problem List:  1. History of NMSC  - BCC, L groin, s/p MMS in OR 11/11/22    Subjective: The patient is a 62 year old man who presents today for a wound check after recent dermatologic surgery. No concerns for infection today. The patient continues with daily wound cares as recommended.    Patient reports uncomplicated healing since last visit. He is performing daily bandage changes. Notes improvement in odor, both surgical site and FTSG donor site are less tender.    No other associated symptoms, modifying factors, or prior treatments, except when noted above. The patient denies any constitutional symptoms, lymphadenopathy, unintentional weight loss or decreased appetite. No other skin concerns today.    Objective:   Gen: This is a well appearing individual in no acute distress. The patient is alert and oriented x 3.  An exam of the abdomen and groin was performed today and visualized the following:  - Healing linear incision on mid lower abdomen  - Healing linear incision and graft on L groin. There is superficial epidermolysis of graft with underlying granulation tissue  - The surgical site noted above is clean, dry, and intact. There is no surrounding erythema, purulence, or significant tenderness to palpation. No clinical evidence of infection noted today.    Assessment and Plan:     # BCC, L groin, s/p MMS in OR 11/11/22  - The patient's surgery site(s) is/are healing very well. No evidence of infection on examination today.  - Prolene sutures on abdomen removed today  - Devitalized graft tissue gently debrided  today  - The patient was told to continue with wound cares until the area(s) is/are no longer crusted.   - The patient should follow up with dermatologic surgery in 4 weeks, as well as continue with regular skin exams in general dermatology clinic.    The patient was discussed with and evaluated by attending physician, Clifford Hernandez MD.    Felipe Bourne MD  Dermatology, PGY-5  Mohs surgery fellow    Scribe Disclosure:  I, Justin Allison, am serving as a scribe to document services personally performed by Clifford Hernandez MD based on data collection and the provider's statements to me.      Attending Attestation  I attest that I discussed the case with the Fellow.  I agree with the plan.   I have reviewed the note and edited it as necessary.    Clifford Hernandez M.D.  Professor  Director of Dermatologic Surgery  Department of Dermatology  [facility]

## 2022-12-05 NOTE — PROGRESS NOTES
Dermatologic Surgery Clinic Note    Dec 5, 2022    Dermatology Problem List:  1. History of NMSC  - BCC, L groin, s/p MMS in OR 11/11/22    Subjective: The patient is a 62 year old man who presents today for a wound check after recent dermatologic surgery. No concerns for infection today. The patient continues with daily wound cares as recommended.    Patient reports uncomplicated healing since last visit. He is performing daily bandage changes. Notes improvement in odor, both surgical site and FTSG donor site are less tender.    No other associated symptoms, modifying factors, or prior treatments, except when noted above. The patient denies any constitutional symptoms, lymphadenopathy, unintentional weight loss or decreased appetite. No other skin concerns today.    Objective:   Gen: This is a well appearing individual in no acute distress. The patient is alert and oriented x 3.  An exam of the abdomen and groin was performed today and visualized the following:  - Healing linear incision on mid lower abdomen  - Healing linear incision and graft on L groin. There is superficial epidermolysis of graft with underlying granulation tissue  - The surgical site noted above is clean, dry, and intact. There is no surrounding erythema, purulence, or significant tenderness to palpation. No clinical evidence of infection noted today.    Assessment and Plan:     # BCC, L groin, s/p MMS in OR 11/11/22  - The patient's surgery site(s) is/are healing very well. No evidence of infection on examination today.  - Prolene sutures on abdomen removed today  - Devitalized graft tissue gently debrided today  - The patient was told to continue with wound cares until the area(s) is/are no longer crusted.   - The patient should follow up with dermatologic surgery in 4 weeks, as well as continue with regular skin exams in general dermatology clinic.    The patient was discussed with and evaluated by attending physician, Clifford Hernandez MD.    Felipe  MD Steffen  Dermatology, PGY-5  Mohs surgery fellow    Scribe Disclosure:  I, Justin Allison, am serving as a scribe to document services personally performed by Clifford Hernandez MD based on data collection and the provider's statements to me.      Attending Attestation  I attest that I discussed the case with the Fellow.  I agree with the plan.   I have reviewed the note and edited it as necessary.    Clifford Hernandez M.D.  Professor  Director of Dermatologic Surgery  Department of Dermatology  [facility]

## 2022-12-26 ENCOUNTER — HOSPITAL ENCOUNTER (OUTPATIENT)
Dept: WOUND CARE | Facility: CLINIC | Age: 62
Discharge: HOME | End: 2022-12-26
Attending: SURGERY | Admitting: SURGERY
Payer: COMMERCIAL

## 2022-12-26 DIAGNOSIS — L98.499: Primary | ICD-10-CM

## 2022-12-26 DIAGNOSIS — S31.104D NON-HEALING OPEN WOUND OF LEFT GROIN, SUBSEQUENT ENCOUNTER: ICD-10-CM

## 2022-12-26 PROCEDURE — 97602 WOUND(S) CARE NON-SELECTIVE: CPT

## 2022-12-26 PROCEDURE — 99213 OFFICE O/P EST LOW 20 MIN: CPT | Performed by: SURGERY

## 2022-12-26 NOTE — PROGRESS NOTES
[facility] [place] Progress Note    Subject: Fish Abad history of basal cell malignancy left thigh lateral aspect of scrotum, is undergone Mohs procedure, has a positive margin will require further management however we will first have his low back surgery performed.  He was admitted after the Mohs procedure for pain control, Dilaudid PCA.  He has slight breakdown of the split-thickness skin graft harvest site abdomen ulcer, currently utilizing Vaseline and Telfa pad  Patient Active Problem List   Diagnosis     Acute bilateral low back pain with right-sided sciatica     Allergic rhinitis     CPAP (continuous positive airway pressure) dependence     Decreased activity tolerance     Hx of tonsillectomy     Left renal stone     FRANKY (obstructive sleep apnea)     Weakness of trunk musculature     Non-healing left groin open wound     BCC (basal cell carcinoma), leg, left     Acute post-operative pain     Past Medical History:   Diagnosis Date     BCC (basal cell carcinoma), leg, left      Benign essential hypertension      Chronic back pain      Depression      Diabetes mellitus (H)      Gastroesophageal reflux disease without esophagitis      Mixed hyperlipidemia      Seasonal allergic rhinitis      Exam:  There were no vitals taken for this visit.  Wound (used by OP [facility] only) 08/03/22 Left groin (Active)   Thickness/Stage full thickness 12/26/22 1006   Base granulating 12/26/22 1006   Periwound pink 12/26/22 1006   Periwound Temperature warm 12/26/22 1006   Periwound Skin Turgor soft 12/26/22 1006   Edges open 12/26/22 1006   Length (cm) 9 12/26/22 1006   Width (cm) 6.5 12/26/22 1006   Depth (cm) 0.1 12/26/22 1006   Wound (cm^2) 58.5 cm^2 12/26/22 1006   Wound Volume (cm^3) 5.85 cm^3 12/26/22 1006   Wound healing % 64.55 12/26/22 1006   Drainage Characteristics/Odor serosanguineous 12/26/22 1006   Drainage Amount moderate 12/26/22 1006   Care, Wound non-select wound debridement performed 10/04/22  0954       Wound (used by OP [facility] only) 12/26/22 1007 Right groin surgical (Active)   Thickness/Stage full thickness 12/26/22 1006   Base yellow 12/26/22 1006   Length (cm) 0.8 12/26/22 1006   Width (cm) 4.6 12/26/22 1006   Depth (cm) 0.1 12/26/22 1006   Wound (cm^2) 3.68 cm^2 12/26/22 1006   Wound Volume (cm^3) 0.37 cm^3 12/26/22 1006   Drainage Characteristics/Odor serosanguineous 12/26/22 1006   Drainage Amount moderate 12/26/22 1006       Incision/Surgical Site 11/11/22 Left Groin (Active)       Incision/Surgical Site 11/11/22 Left Abdomen (Active)     See photodocumentation, abdominal incisional wound with slight breakdown, biofilm, inflammation, left medial thigh wound at positive margin was basal cell malignancy status post Mohs procedure.  No cellulitis.        Impression: Pending back surgery, status post Mohs surgical procedure for management basal cell malignancy left medial thigh and scrotum    Plan: We will dress the wounds with hydrogel, gauze, after soak with hypochlorous acid Vashe.  He will follow-up with Mohs surgical procedure for revision of positive margins.  He will be undergoing back surgery in the next 2 to 3 weeks.  Recommended discontinuing Hibiclens and Vaseline both of which can be cytotoxic when used chronically in a chronic wound.  Patient will return to the clinic in 4 weeks time      Further instructions from your care team       Fish Abad      1960        A DME order for supplies has been placed to [facility].  If there are any issues with your order including not receiving the order please call  [facility] at [phone] option 3.  They can also provide a tracking number for you if you had supplies shipped to you.    Congratulations on stopping smoking!! :)  Keep blood sugars below 150 and A1C below 7  Use boxer shorts (not briefs).   Keep area open to air when possible/when at home.    Medications/supplements to aid in healing:  Vitamin D3 5,000  iu per day  Chelsea C 1,000 mg take daily  Vitamin B Complex with folic acid take 1 tablet daily  Vitamin B 12 1000 mcg daily  Vein Formula 1000mg. Take 500mg capsule twice daily. Order from  www.[employer] or call [phone]. (The 1000mg is two (2) 500mg  capsules)    Wound care recommendations to   LEFT groin:  - After cleansing with mild unscented soap and water, apply VASHE on gauze, lay into  wound bed, let sit for 30 minutes, remove gauze (do not rinse)  - Apply lidocaine gel  - Apply 1/2 sheet of Xeroform 5x9 to cover wound  - Cover with one KerraMax 8x9 pad  - Secure with mesh underwear    ABDOMINAL wound  - cleanse with Vashe, same as groin wound  -   Change daily     BELLE Nicole M.D. December 26, 2022    Call us at [phone] if you have any questions about your wounds, have redness or swelling around your wound, have a fever of 101 or greater or if you have any other problems or concerns. We answer the phone Monday through Friday 8 am to 4 pm, please leave a message as we check the voicemail frequently throughout the day.     If you had a positive experience please indicate that on your patient satisfaction survey form that [facility] will be sending you.    It was a pleasure meeting with you today.  Thank you for allowing me and my team the privilege of caring for you today.  YOU are the reason we are here, and I truly hope we provided you with the excellent service you deserve.  Please let us know if there is anything else we can do for you so that we can be sure you are leaving completely satisfied with your care experience.      If you have any billing related questions please call the [facility] Business office at [phone]. The clinic staff does not handle billing related matters.    If you are scheduled to have a follow up appointment, you will receive a reminder call the day before your visit. On the appointment day please arrive 15 minutes prior to your appointment time. If  you are unable to keep that appointment, please call the clinic to cancel or reschedule. If you are more than 10 minutes late or greater for your appointment, the clinic policy is that you may be asked to reschedule.            Eder Nicole MD on 12/26/2022 at 10:14 AM      Dictated using Dragon voice recognition software which may result in transcription errors

## 2022-12-26 NOTE — DISCHARGE INSTRUCTIONS
Fish Abad      1960    A DME order for supplies has been placed to [facility].  PLEASE MAIL IT TO PATIENT.  If there are any issues with your order including not receiving the order please call  [facility] at [phone] option 3.  They can also provide a tracking number for you if you had supplies shipped to you.    Congratulations on stopping smoking!! :)  Keep blood sugars below 150 and A1C below 7  Use boxer shorts (not briefs).   Keep area open to air when possible/when at home.    Medications/supplements to aid in healing:  Vitamin D3 5,000 iu per day  Chelsea C 1,000 mg take daily  Vitamin B Complex with folic acid take 1 tablet daily  Vitamin B 12 1000 mcg daily  Vein Formula 1000mg. Take 500mg capsule twice daily. Order from  www.[employer] or call [phone]. (The 1000mg is two (2) 500mg  capsules)    Wound care recommendations to   LEFT groin:  - After cleansing with mild unscented soap and water during shower apply VASHE on 1  4x4 gauze,   lay into wound bed, let sit for 15-20 minutes, remove gauze (do not rinse)  - Apply Skintegrity Hydrogel   - Apply 2  4x4 gauze to cover wound  - Secure with mesh underwear    ABDOMINAL wound  - cleanse with Vashe, same as groin wound ( use 2 of 4x4 gauze)  - Apply Skintegrity Hydrogel   - cover with 1  4x4 gauze   - Secure with mesh underwear     Change both dressings daily after shower     BELLE Nicole M.D. December 26, 2022    Call us at [phone] if you have any questions about your wounds, have redness or swelling around your wound, have a fever of 101 or greater or if you have any other problems or concerns. We answer the phone Monday through Friday 8 am to 4 pm, please leave a message as we check the voicemail frequently throughout the day.     If you had a positive experience please indicate that on your patient satisfaction survey form that [facility] will be sending you.    It was a pleasure meeting with  you today.  Thank you for allowing me and my team the privilege of caring for you today.  YOU are the reason we are here, and I truly hope we provided you with the excellent service you deserve.  Please let us know if there is anything else we can do for you so that we can be sure you are leaving completely satisfied with your care experience.      If you have any billing related questions please call the [facility] Business office at [phone]. The clinic staff does not handle billing related matters.    If you are scheduled to have a follow up appointment, you will receive a reminder call the day before your visit. On the appointment day please arrive 15 minutes prior to your appointment time. If you are unable to keep that appointment, please call the clinic to cancel or reschedule. If you are more than 10 minutes late or greater for your appointment, the clinic policy is that you may be asked to reschedule.

## 2023-01-09 ENCOUNTER — OFFICE VISIT (OUTPATIENT)
Dept: DERMATOLOGY | Facility: CLINIC | Age: 63
End: 2023-01-09
Payer: COMMERCIAL

## 2023-01-09 DIAGNOSIS — Z48.89 ENCOUNTER FOR POSTOPERATIVE WOUND CHECK: ICD-10-CM

## 2023-01-09 DIAGNOSIS — C44.719 BASAL CELL CARCINOMA (BCC) OF LEFT THIGH: Primary | ICD-10-CM

## 2023-01-09 PROCEDURE — 99024 POSTOP FOLLOW-UP VISIT: CPT | Mod: GC | Performed by: DERMATOLOGY

## 2023-01-09 ASSESSMENT — PAIN SCALES - GENERAL: PAINLEVEL: NO PAIN (0)

## 2023-01-09 ASSESSMENT — PATIENT HEALTH QUESTIONNAIRE - PHQ9: SUM OF ALL RESPONSES TO PHQ QUESTIONS 1-9: 14

## 2023-01-09 NOTE — LETTER
1/9/2023       RE: Fish Abad  [address] [address] [address]     Dear Colleague,    Thank you for referring your patient, Fish Abad, to the [facility] [place] at [facility]. Please see a copy of my visit note below.    Dermatologic Surgery Clinic Note  Jan 9, 2023    Dermatology Problem List:  1. History of NMSC  - BCC, left groin, s/p MMS in OR 11/11/22    Subjective: The patient is a 62 year old man who presents today for a wound check after recent dermatologic surgery. No concerns for infection today. The patient continues with daily wound cares as recommended.    Patient reports uncomplicated healing. Patient continues to follow with wound clinic. Denies symptoms at surgical sites. Notes one area that will intermittently bleed with bandage change.    No other associated symptoms, modifying factors, or prior treatments, except when noted above. The patient denies any constitutional symptoms, lymphadenopathy, unintentional weight loss or decreased appetite. No other skin concerns today.    Objective:   Gen: This is a well appearing individual in no acute distress. The patient is alert and oriented x 3.  An exam of the left groin was performed today and visualized the following:  - Well-healed linear incisions in L groin with focus of hypergranulation in L inguinal crease  - The surgical site noted above is clean, dry, and intact. There is no surrounding erythema, purulence, or significant tenderness to palpation. No clinical evidence of infection noted today.    Assessment and Plan:     # BCC, L groin, s/p MMS in OR 11/11/22  - The patient's surgery site(s) is/are healing very well. No evidence of infection on examination today.  - Area of hypergranulation tissue treated with silver nitrate today  - The patient was told to continue with wound cares until the area(s) is/are no longer crusted.   -  The patient should follow up with dermatologic surgery in 4 weeks, as well as continue with regular skin exams in general dermatology clinic.  - Plan for excision of area of positive margin at superior aspect once fully healed (schedule at follow up)    The patient was discussed with and evaluated by attending physician, Clifford Hernandez MD.    Felipe Bourne MD  Dermatology, PGY-5  Mohs surgery fellow    Scribe Disclosure:  I, Twyla Mathias, am serving as a scribe to document services personally performed by Clifford Hernandez MD based on data collection and the provider's statements to me.     Attending Attestation  I attest that I discussed the case with the Fellow.  I agree with the plan.   I have reviewed the note and edited it as necessary.    Clifford Hernandez M.D.  Professor  Director of Dermatologic Surgery  Department of Dermatology  [facility]

## 2023-01-09 NOTE — PROGRESS NOTES
Dermatologic Surgery Clinic Note  Jan 9, 2023    Dermatology Problem List:  1. History of NMSC  - BCC, left groin, s/p MMS in OR 11/11/22    Subjective: The patient is a 62 year old man who presents today for a wound check after recent dermatologic surgery. No concerns for infection today. The patient continues with daily wound cares as recommended.    Patient reports uncomplicated healing. Patient continues to follow with wound clinic. Denies symptoms at surgical sites. Notes one area that will intermittently bleed with bandage change.    No other associated symptoms, modifying factors, or prior treatments, except when noted above. The patient denies any constitutional symptoms, lymphadenopathy, unintentional weight loss or decreased appetite. No other skin concerns today.    Objective:   Gen: This is a well appearing individual in no acute distress. The patient is alert and oriented x 3.  An exam of the left groin was performed today and visualized the following:  - Well-healed linear incisions in L groin with focus of hypergranulation in L inguinal crease  - The surgical site noted above is clean, dry, and intact. There is no surrounding erythema, purulence, or significant tenderness to palpation. No clinical evidence of infection noted today.    Assessment and Plan:     # BCC, L groin, s/p MMS in OR 11/11/22  - The patient's surgery site(s) is/are healing very well. No evidence of infection on examination today.  - Area of hypergranulation tissue treated with silver nitrate today  - The patient was told to continue with wound cares until the area(s) is/are no longer crusted.   - The patient should follow up with dermatologic surgery in 4 weeks, as well as continue with regular skin exams in general dermatology clinic.  - Plan for excision of area of positive margin at superior aspect once fully healed (schedule at follow up)    The patient was discussed with and evaluated by attending physician, Clifford Hernandez,  MD.    Felipe Bourne MD  Dermatology, PGY-5  Mohs surgery fellow    Scribe Disclosure:  I, Twyla Mathias, am serving as a scribe to document services personally performed by Clifford Hernandez MD based on data collection and the provider's statements to me.     Attending Attestation  I attest that I discussed the case with the Fellow.  I agree with the plan.   I have reviewed the note and edited it as necessary.    Clifford Hernandez M.D.  Professor  Director of Dermatologic Surgery  Department of Dermatology  [facility]

## 2023-01-09 NOTE — NURSING NOTE
Chief Complaint   Patient presents with     Derm Problem     Patient is here today for a wound check follow up on the left thigh.     Debra ROSARIO RN

## 2023-01-24 ENCOUNTER — HOSPITAL ENCOUNTER (OUTPATIENT)
Dept: WOUND CARE | Facility: CLINIC | Age: 63
Discharge: HOME | End: 2023-01-24
Attending: SURGERY | Admitting: SURGERY
Payer: OTHER MISCELLANEOUS

## 2023-01-24 VITALS — HEART RATE: 75 BPM | SYSTOLIC BLOOD PRESSURE: 152 MMHG | TEMPERATURE: 98.2 F | DIASTOLIC BLOOD PRESSURE: 92 MMHG

## 2023-01-24 DIAGNOSIS — S31.104D NON-HEALING OPEN WOUND OF LEFT GROIN, SUBSEQUENT ENCOUNTER: Primary | ICD-10-CM

## 2023-01-24 PROCEDURE — 97602 WOUND(S) CARE NON-SELECTIVE: CPT

## 2023-01-24 PROCEDURE — 99212 OFFICE O/P EST SF 10 MIN: CPT | Performed by: SURGERY

## 2023-01-24 NOTE — DISCHARGE INSTRUCTIONS
Fish Abad      1960    A DME order was not completed because supplies were not needed    Congratulations on stopping smoking!! :)  Keep blood sugars below 150 and A1C below 7  You can switch to use boxer shorts (not briefs).  Keep area open to air when possible/when at home.    Medications/supplements to aid in healing:  Vitamin D3 5,000 iu per day  Chelsea C 1,000 mg take daily  Vitamin B Complex with folic acid take 1 tablet daily  Vitamin B 12 1000 mcg daily  Vein Formula 1000mg. Take 500mg capsule twice daily.   Order from www.[employer] or call [phone]. (The 1000mg is two (2) 500mg  capsules)    Wound care recommendations to:  LEFT groin:  - After cleansing with mild unscented soap and water during shower   - Apply thin layer of Cera Ve cream with essential ceramides which is available OTC (ask for pharmacy assist if needed)  - you may resume wearing boxers      ABDOMINAL wound  - vashe moistened gauze soak for 10-15 minutes, remove gauze, then:  - Apply Skintegrity Hydrogel to wound  - cover with 4-sided bandaid     To back incision, applied layer of cavilon skin prep 1/24/23     BELLE Nicole M.D. January 24, 2023    Call us at [phone] if you have any questions about your wounds, have redness or swelling around your wound, have a fever of 101 or greater or if you have any other problems or concerns. We answer the phone Monday through Friday 8 am to 4 pm, please leave a message as we check the voicemail frequently throughout the day.     If you had a positive experience please indicate that on your patient satisfaction survey form that [facility] will be sending you.    It was a pleasure meeting with you today.  Thank you for allowing me and my team the privilege of caring for you today.  YOU are the reason we are here, and I truly hope we provided you with the excellent service you deserve.  Please let us know if there is anything else we can do for you so that we can be  sure you are leaving completely satisfied with your care experience.      If you have any billing related questions please call the [facility] Business office at [phone]. The clinic staff does not handle billing related matters.    If you are scheduled to have a follow up appointment, you will receive a reminder call the day before your visit. On the appointment day please arrive 15 minutes prior to your appointment time. If you are unable to keep that appointment, please call the clinic to cancel or reschedule. If you are more than 10 minutes late or greater for your appointment, the clinic policy is that you may be asked to reschedule.

## 2023-01-24 NOTE — PROGRESS NOTES
[facility] Progress Note    Subject: Fish Abad returns for evaluation of left lateral thigh wound, history of basal cell malignancy, status post Mohs surgical procedure, positive margins, will be having redo Mohs procedure near the base of the penis, scrotum, highly sensitive area, he has significant postoperative pain after prior procedure, he will discuss with the Mohs surgeon about 24-hour observation for pain control after the procedure.  He recently went through successful back surgery, incision well healing, this was performed 11 days prior.    Patient Active Problem List   Diagnosis     Acute bilateral low back pain with right-sided sciatica     Allergic rhinitis     CPAP (continuous positive airway pressure) dependence     Decreased activity tolerance     Hx of tonsillectomy     Left renal stone     FRANKY (obstructive sleep apnea)     Weakness of trunk musculature     Non-healing left groin open wound     BCC (basal cell carcinoma), leg, left     Acute post-operative pain     Past Medical History:   Diagnosis Date     BCC (basal cell carcinoma), leg, left      Benign essential hypertension      Chronic back pain      Depression      Diabetes mellitus (H)      Gastroesophageal reflux disease without esophagitis      Mixed hyperlipidemia      Seasonal allergic rhinitis      Exam:  BP (!) 152/92 (BP Location: Right arm)   Pulse 75   Temp 98.2  F (36.8  C)   Wound (used by OP [facility] only) 08/03/22 Left groin (Active)   Thickness/Stage full thickness 01/24/23 1423   Base granulating 01/24/23 1423   Periwound pink 01/24/23 1423   Periwound Temperature warm 01/24/23 1423   Periwound Skin Turgor soft 01/24/23 1423   Edges open 01/24/23 1423   Length (cm) 9 12/26/22 1006   Width (cm) 6.5 12/26/22 1006   Depth (cm) 0.1 12/26/22 1006   Wound (cm^2) 58.5 cm^2 12/26/22 1006   Wound Volume (cm^3) 5.85 cm^3 12/26/22 1006   Wound healing % 64.55 12/26/22 1006   Drainage Characteristics/Odor  serosanguineous 01/24/23 1423   Drainage Amount moderate 01/24/23 1423   Care, Wound non-select wound debridement performed 10/04/22 0954       Wound (used by OP [facility] only) 12/26/22 1007 midline abdomen surgical (Active)   Thickness/Stage full thickness 01/24/23 1423   Base scab;dry;pink 01/24/23 1423   Periwound intact 01/24/23 1423   Periwound Temperature warm 01/24/23 1423   Periwound Skin Turgor soft 01/24/23 1423   Edges open 01/24/23 1423   Length (cm) 0.4 01/24/23 1423   Width (cm) 2.4 01/24/23 1423   Depth (cm) 0.2 01/24/23 1423   Wound (cm^2) 0.96 cm^2 01/24/23 1423   Wound Volume (cm^3) 0.19 cm^3 01/24/23 1423   Wound healing % 73.91 01/24/23 1423   Drainage Characteristics/Odor serosanguineous 01/24/23 1423   Drainage Amount moderate 01/24/23 1423       Incision/Surgical Site 11/11/22 Left Groin (Active)       Incision/Surgical Site 11/11/22 Left Abdomen (Active)     Left groin examined, no open wounds, wound on the abdomen near completely closed, back incisions well-healing without cellulitis.        Impression: Basal cell malignancy left groin, pending repeat Mohs surgery for positive margin, status post low back surgery 11 days prior    Plan: We will dress the wounds with hydrogel and 4 sided bandage to abdominal wound, ceramide-based lotion to left groin, he showers on a daily basis.  He will follow-up here as needed.  Can start wearing boxer shorts..      Further instructions from your care team       Fish Abad      1960    A DME order was not completed because supplies were not needed    Congratulations on stopping smoking!! :)  Keep blood sugars below 150 and A1C below 7  You can switch to use boxer shorts (not briefs).  Keep area open to air when possible/when at home.    Medications/supplements to aid in healing:  Vitamin D3 5,000 iu per day  Chelsea C 1,000 mg take daily  Vitamin B Complex with folic acid take 1 tablet daily  Vitamin B 12 1000 mcg daily  Vein Formula 1000mg. Take 500mg  capsule twice daily.   Order from [email or web address] or call [phone]. (The 1000mg is two (2) 500mg  capsules)    Wound care recommendations to:  LEFT groin:  - After cleansing with mild unscented soap and water during shower   - Apply thin layer of Cera Ve cream with essential ceramides which is available OTC (ask for pharmacy assist if needed)  - you may resume wearing boxers      ABDOMINAL wound  - vashe moistened gauze soak for 10-15 minutes, remove gauze, then:  - Apply Skintegrity Hydrogel to wound  - cover with 4-sided bandaid     To back incision, applied layer of cavilon skin prep 1/24/23     BELLE Nicole M.D. January 24, 2023    Call us at [phone] if you have any questions about your wounds, have redness or swelling around your wound, have a fever of 101 or greater or if you have any other problems or concerns. We answer the phone Monday through Friday 8 am to 4 pm, please leave a message as we check the voicemail frequently throughout the day.     If you had a positive experience please indicate that on your patient satisfaction survey form that [facility] will be sending you.    It was a pleasure meeting with you today.  Thank you for allowing me and my team the privilege of caring for you today.  YOU are the reason we are here, and I truly hope we provided you with the excellent service you deserve.  Please let us know if there is anything else we can do for you so that we can be sure you are leaving completely satisfied with your care experience.      If you have any billing related questions please call the [facility] Business office at [phone]. The clinic staff does not handle billing related matters.    If you are scheduled to have a follow up appointment, you will receive a reminder call the day before your visit. On the appointment day please arrive 15 minutes prior to your appointment time. If you are unable to keep that appointment, please call the clinic to cancel or  reschedule. If you are more than 10 minutes late or greater for your appointment, the clinic policy is that you may be asked to reschedule.         Eder Nicole MD on 1/24/2023 at 2:41 PM      Dictated using Dragon voice recognition software which may result in transcription errors

## 2023-02-06 ENCOUNTER — OFFICE VISIT (OUTPATIENT)
Dept: DERMATOLOGY | Facility: CLINIC | Age: 63
End: 2023-02-06
Payer: COMMERCIAL

## 2023-02-06 DIAGNOSIS — L90.5 SCAR: Primary | ICD-10-CM

## 2023-02-06 DIAGNOSIS — Z85.828 HISTORY OF NONMELANOMA SKIN CANCER: ICD-10-CM

## 2023-02-06 DIAGNOSIS — C44.719 BASAL CELL CARCINOMA (BCC) OF LEFT THIGH: ICD-10-CM

## 2023-02-06 PROCEDURE — 99024 POSTOP FOLLOW-UP VISIT: CPT | Mod: GC | Performed by: DERMATOLOGY

## 2023-02-06 ASSESSMENT — PAIN SCALES - GENERAL: PAINLEVEL: NO PAIN (0)

## 2023-02-06 NOTE — NURSING NOTE
Chief Complaint   Patient presents with     Scar Management     Patient is here today for a scar eval groin area.      Shira DUVAL CMA

## 2023-02-06 NOTE — LETTER
2/6/2023       RE: Fish Abad  [id number] [address] [id number]     Dear Colleague,    Thank you for referring your patient, Fish Abad, to the [facility] [place] at [facility]. Please see a copy of my visit note below.    Dermatologic Surgery Clinic Note    Feb 6, 2023    Dermatology Problem List:  1. History of NMSC  - BCC, left groin, s/p MMS in OR 11/11/22, pending additional Mohs for positive margin    Subjective: The patient is a 62 year old man who presents today for a wound check after recent dermatologic surgery. No concerns for infection today. The patient continues with daily wound cares as recommended.    Patient reports uncomplicated healing of surgical sites. He continues to follow with the wound clinic. Denies symptoms or concerns.    No other associated symptoms, modifying factors, or prior treatments, except when noted above. The patient denies any constitutional symptoms, lymphadenopathy, unintentional weight loss or decreased appetite. No other skin concerns today.    Objective:   Gen: This is a well appearing individual in no acute distress. The patient is alert and oriented x 3.  An exam of the left groin was performed today and visualized the following:  - Very well healed full thickness skin graft repair on the medial thigh.  - Well-healed linear incision on lower abdomen with focal crusting at center of incision  - The surgical site noted above is clean, dry, and intact. There is no surrounding erythema, purulence, or significant tenderness to palpation. No clinical evidence of infection noted today.    Assessment and Plan:     # BCC, L groin, s/p MMS in OR 11/11/22  - Plan for additional Mohs surgery for positive peripheral margin pending clearance from neurosurgery (patient recently underwent spine surgery). Procedure and perioperative care d/w patient  - The patient's surgery  site(s) is/are healing very well. No evidence of infection on examination today.  - The patient was told to continue with wound cares until the area(s) is/are no longer crusted.   - The patient should follow up with dermatologic surgery for MMS, as well as continue with regular skin exams in general dermatology clinic.    The patient was discussed with and evaluated by attending physician, Clifford Hernandez MD.    Felipe Bourne MD  Dermatology, PGY-5  Mohs surgery fellow    Scribe Disclosure:  I, ZO WHITE, am serving as a scribe to document services personally performed by Clifford Hernandez MD based on data collection and the provider's statements to me.       Attending Attestation  I attest that the Fellow recorded the interview and exam that I personally performed.  I have reviewed the note and edited it as necessary.    Clifford Hernandez M.D.  Professor  Director of Dermatologic Surgery  Department of Dermatology  [facility]

## 2023-02-06 NOTE — PROGRESS NOTES
Dermatologic Surgery Clinic Note    Feb 6, 2023    Dermatology Problem List:  1. History of NMSC  - BCC, left groin, s/p MMS in OR 11/11/22, pending additional Mohs for positive margin    Subjective: The patient is a 62 year old man who presents today for a wound check after recent dermatologic surgery. No concerns for infection today. The patient continues with daily wound cares as recommended.    Patient reports uncomplicated healing of surgical sites. He continues to follow with the wound clinic. Denies symptoms or concerns.    No other associated symptoms, modifying factors, or prior treatments, except when noted above. The patient denies any constitutional symptoms, lymphadenopathy, unintentional weight loss or decreased appetite. No other skin concerns today.    Objective:   Gen: This is a well appearing individual in no acute distress. The patient is alert and oriented x 3.  An exam of the left groin was performed today and visualized the following:  - Very well healed full thickness skin graft repair on the medial thigh.  - Well-healed linear incision on lower abdomen with focal crusting at center of incision  - The surgical site noted above is clean, dry, and intact. There is no surrounding erythema, purulence, or significant tenderness to palpation. No clinical evidence of infection noted today.    Assessment and Plan:     # BCC, L groin, s/p MMS in OR 11/11/22  - Plan for additional Mohs surgery for positive peripheral margin pending clearance from neurosurgery (patient recently underwent spine surgery). Procedure and perioperative care d/w patient  - The patient's surgery site(s) is/are healing very well. No evidence of infection on examination today.  - The patient was told to continue with wound cares until the area(s) is/are no longer crusted.   - The patient should follow up with dermatologic surgery for MMS, as well as continue with regular skin exams in general dermatology clinic.    The patient  was discussed with and evaluated by attending physician, Clifford Hernandez MD.    Felipe Bourne MD  Dermatology, PGY-5  Mohs surgery fellow    Scribe Disclosure:  I, ZO WHITE, am serving as a scribe to document services personally performed by Clifford Hernandez MD based on data collection and the provider's statements to me.       Attending Attestation  I attest that the Fellow recorded the interview and exam that I personally performed.  I have reviewed the note and edited it as necessary.    Clifford Hernandez M.D.  Professor  Director of Dermatologic Surgery  Department of Dermatology  [facility]

## 2023-02-07 ENCOUNTER — MYC MEDICAL ADVICE (OUTPATIENT)
Dept: DERMATOLOGY | Facility: CLINIC | Age: 63
End: 2023-02-07
Payer: COMMERCIAL

## 2023-02-07 DIAGNOSIS — C44.719 BASAL CELL CARCINOMA (BCC) OF LEFT THIGH: Primary | ICD-10-CM

## 2023-02-14 ENCOUNTER — TELEPHONE (OUTPATIENT)
Dept: DERMATOLOGY | Facility: CLINIC | Age: 63
End: 2023-02-14
Payer: COMMERCIAL

## 2023-02-14 NOTE — TELEPHONE ENCOUNTER
Called patient to schedule surgery with Dr. Hernandez    Date of Surgery: 03/23    Surgery type: mohs    Consult scheduled: No    Has patient had mohs with us before? Yes    Additional comments: pat Jung on 2/14/2023 at 9:40 AM

## 2023-02-21 ENCOUNTER — HOSPITAL ENCOUNTER (OUTPATIENT)
Dept: WOUND CARE | Facility: CLINIC | Age: 63
Discharge: HOME | End: 2023-02-21
Attending: SURGERY
Payer: COMMERCIAL

## 2023-02-21 DIAGNOSIS — C44.719 BCC (BASAL CELL CARCINOMA), LEG, LEFT: ICD-10-CM

## 2023-02-21 PROCEDURE — 99213 OFFICE O/P EST LOW 20 MIN: CPT | Mod: 95 | Performed by: SURGERY

## 2023-02-21 NOTE — PROGRESS NOTES
Patient called for a telephone visit. Certified Wound Care Nurse time spent evaluating patient record, completed a full evaluation and documented wound(s) & talon-wound skin; provided recommendation based on treatment plan. Reviewed discharge instructions, patient education, and discussed plan of care with appropriate medical team staff members and patient and/or family members.

## 2023-02-21 NOTE — DISCHARGE INSTRUCTIONS
Fish Abad      1960    A DME order was not completed because supplies were not needed    Congratulations on stopping smoking!! :)  Keep blood sugars below 150 and A1C below 7  You can switch to use boxer shorts (not briefs).  Keep area open to air when possible/when at home.    Medications/supplements to aid in healing:  Vitamin D3 5,000 iu per day  Chelsea C 1,000 mg take daily  Vitamin B Complex with folic acid take 1 tablet daily  Vitamin B 12 1000 mcg daily  Vein Formula 1000mg. Take 500mg capsule twice daily.  Order from www.[employer] or call [phone]. (The 1000mg is two (2)  500mg  capsules)    Wound care recommendations to: LEFT groin and abdomen  - After cleansing with mild unscented soap and water during shower  - Apply thin layer of Cera Ve cream with essential ceramides which is available OTC  (ask for pharmacy assist if needed)  - you may resume wearing boxers       "[employer]Atilio Nicole M.D. February 21, 2023    Call us at [phone] if you have any questions about your wounds, have redness or swelling around your wound, have a fever of 101 or greater or if you have any other problems or concerns. We answer the phone Monday through Friday 8 am to 4 pm, please leave a message as we check the voicemail frequently throughout the day.     If you had a positive experience please indicate that on your patient satisfaction survey form that [facility] will be sending you.    It was a pleasure meeting with you today.  Thank you for allowing me and my team the privilege of caring for you today.  YOU are the reason we are here, and I truly hope we provided you with the excellent service you deserve.  Please let us know if there is anything else we can do for you so that we can be sure you are leaving completely satisfied with your care experience.      If you have any billing related questions please call the [facility] Business office at [phone]. The clinic staff does not handle  billing related matters.    If you are scheduled to have a follow up appointment, you will receive a reminder call the day before your visit. On the appointment day please arrive 15 minutes prior to your appointment time. If you are unable to keep that appointment, please call the clinic to cancel or reschedule. If you are more than 10 minutes late or greater for your appointment, the clinic policy is that you may be asked to reschedule.

## 2023-02-21 NOTE — PROGRESS NOTES
Phone-Visit Details    Type of service:  Phone Visit    Length of call: 15 minutes    Originating Location (pt. Location): Home    Distant Location (provider location):  [facility] [place]     Mode of Communication:  Telephone    Provider has received verbal consent for a Telephone Visit from the patient? Yes  [facility] [place] Progress Note    Subject: Fish Abad history of basal cell malignancy left groin status post Mohs surgical procedure, has a follow-up Mohs surgical procedure to remove additional positive basal cell site, otherwise groin and abdomen incisions are closed  QRC on the phone call    Patient Active Problem List   Diagnosis     Acute bilateral low back pain with right-sided sciatica     Allergic rhinitis     CPAP (continuous positive airway pressure) dependence     Decreased activity tolerance     Hx of tonsillectomy     Left renal stone     FRANKY (obstructive sleep apnea)     Weakness of trunk musculature     Non-healing left groin open wound     BCC (basal cell carcinoma), leg, left     Acute post-operative pain     Past Medical History:   Diagnosis Date     BCC (basal cell carcinoma), leg, left      Benign essential hypertension      Chronic back pain      Depression      Diabetes mellitus (H)      Gastroesophageal reflux disease without esophagitis      Mixed hyperlipidemia      Seasonal allergic rhinitis      Exam:  There were no vitals taken for this visit.  Wound (used by OP [facility] only) 08/03/22 Left groin (Active)   Thickness/Stage full thickness 02/21/23 0741   Base granulating 02/21/23 0741   Periwound pink 02/21/23 0741   Periwound Temperature warm 02/21/23 0741   Periwound Skin Turgor soft 02/21/23 0741   Edges open 02/21/23 0741   Length (cm) 3.5 01/24/23 1423   Width (cm) 1.2 01/24/23 1423   Depth (cm) 0.1 01/24/23 1423   Wound (cm^2) 4.2 cm^2 01/24/23 1423   Wound Volume (cm^3) 0.42 cm^3 01/24/23 1423   Wound healing % 97.45 01/24/23 1423    Drainage Characteristics/Odor serosanguineous 02/21/23 0741   Drainage Amount moderate 02/21/23 0741   Care, Wound non-select wound debridement performed 01/24/23 1423       Wound (used by OP [facility] only) 12/26/22 1007 midline abdomen surgical (Active)   Thickness/Stage full thickness 02/21/23 0741   Base scab;dry;pink 02/21/23 0741   Periwound intact 02/21/23 0741   Periwound Temperature warm 02/21/23 0741   Periwound Skin Turgor soft 02/21/23 0741   Edges open 02/21/23 0741   Length (cm) 0.4 01/24/23 1423   Width (cm) 2.4 01/24/23 1423   Depth (cm) 0.2 01/24/23 1423   Wound (cm^2) 0.96 cm^2 01/24/23 1423   Wound Volume (cm^3) 0.19 cm^3 01/24/23 1423   Wound healing % 73.91 01/24/23 1423   Drainage Characteristics/Odor serosanguineous 02/21/23 0741   Drainage Amount moderate 02/21/23 0741   Care, Wound non-select wound debridement performed 01/24/23 1423       Incision/Surgical Site 11/11/22 Left Groin (Active)       Incision/Surgical Site 11/11/22 Left Abdomen (Active)     Telehealth visit, photographs reviewed        Impression: History of basal cell malignancy left groin status post surgical procedure, closed, follow-up as procedure pending    Plan: Apply ceramide based lotion on a daily basis, continue adjunctive micronutrients until 2 months past next Mohs procedure, will follow-up as needed.      Further instructions from your care team       Fish Abad      1960    A DME order was not completed because supplies were not needed    Congratulations on stopping smoking!! :)  Keep blood sugars below 150 and A1C below 7  You can switch to use boxer shorts (not briefs).  Keep area open to air when possible/when at home.    Medications/supplements to aid in healing:  Vitamin D3 5,000 iu per day  Chelsea C 1,000 mg take daily  Vitamin B Complex with folic acid take 1 tablet daily  Vitamin B 12 1000 mcg daily  Vein Formula 1000mg. Take 500mg capsule twice daily.  Order from www.[employer] or call  [phone]. (The 1000mg is two (2)  500mg  capsules)    Wound care recommendations to: LEFT groin and abdomen  - After cleansing with mild unscented soap and water during shower  - Apply thin layer of Cera Ve cream with essential ceramides which is available OTC  (ask for pharmacy assist if needed)  - you may resume wearing boxers       M. Jax Nicole M.D. February 21, 2023    Call us at [phone] if you have any questions about your wounds, have redness or swelling around your wound, have a fever of 101 or greater or if you have any other problems or concerns. We answer the phone Monday through Friday 8 am to 4 pm, please leave a message as we check the voicemail frequently throughout the day.     If you had a positive experience please indicate that on your patient satisfaction survey form that [facility] will be sending you.    It was a pleasure meeting with you today.  Thank you for allowing me and my team the privilege of caring for you today.  YOU are the reason we are here, and I truly hope we provided you with the excellent service you deserve.  Please let us know if there is anything else we can do for you so that we can be sure you are leaving completely satisfied with your care experience.      If you have any billing related questions please call the [facility] Business office at [phone]. The clinic staff does not handle billing related matters.    If you are scheduled to have a follow up appointment, you will receive a reminder call the day before your visit. On the appointment day please arrive 15 minutes prior to your appointment time. If you are unable to keep that appointment, please call the clinic to cancel or reschedule. If you are more than 10 minutes late or greater for your appointment, the clinic policy is that you may be asked to reschedule.        .   Eder Nicole MD on 2/21/2023 at 12:50 PM      Dictated using Dragon voice recognition software which may result in  transcription errors

## 2023-03-23 ENCOUNTER — OFFICE VISIT (OUTPATIENT)
Dept: DERMATOLOGY | Facility: CLINIC | Age: 63
End: 2023-03-23
Payer: COMMERCIAL

## 2023-03-23 VITALS — SYSTOLIC BLOOD PRESSURE: 124 MMHG | DIASTOLIC BLOOD PRESSURE: 79 MMHG | HEART RATE: 64 BPM

## 2023-03-23 DIAGNOSIS — C44.719 BASAL CELL CARCINOMA (BCC) OF LEFT THIGH: ICD-10-CM

## 2023-03-23 PROCEDURE — 12032 INTMD RPR S/A/T/EXT 2.6-7.5: CPT | Performed by: DERMATOLOGY

## 2023-03-23 PROCEDURE — 17313 MOHS 1 STAGE T/A/L: CPT | Performed by: DERMATOLOGY

## 2023-03-23 RX ORDER — NAPROXEN 250 MG/1
TABLET ORAL
COMMUNITY
Start: 2022-08-25

## 2023-03-23 RX ORDER — ATORVASTATIN CALCIUM 10 MG/1
10 TABLET, FILM COATED ORAL
COMMUNITY
Start: 2022-08-08

## 2023-03-23 RX ORDER — LANOLIN ALCOHOL/MO/W.PET/CERES
CREAM (GRAM) TOPICAL
COMMUNITY
Start: 2022-07-15

## 2023-03-23 RX ORDER — GABAPENTIN 300 MG/1
300 CAPSULE ORAL 3 TIMES DAILY
COMMUNITY
Start: 2023-02-27

## 2023-03-23 RX ORDER — METHOCARBAMOL 750 MG/1
750 TABLET, FILM COATED ORAL 4 TIMES DAILY
COMMUNITY
Start: 2023-02-28

## 2023-03-23 RX ORDER — PROPRANOLOL HYDROCHLORIDE 20 MG/1
20 TABLET ORAL 3 TIMES DAILY
COMMUNITY

## 2023-03-23 NOTE — LETTER
3/23/2023       RE: Fish Abad  [address] [address] [facility] [address]     Dear Colleague,    Thank you for referring your patient, Fish Abad, to the [facility]MAT[facility] [place] [facility]. Please see a copy of my visit note below.    [facility] Mohs Surgery Procedure Note    Case #: 1  Date of Service:  Mar 23, 2023  Surgery: Mohs micrographic surgery (MMS)  Staff surgeon: Clifford Hernandez MD  Fellow surgeon: None  Resident surgeon: None  Nurse: Shira Cooper CMA    Tumor Type: BCC  Location: left groin  Derm-Path Accession #: [id number]    Mohs Accession #:   Pre-Op Size: 4.0 cm x 3.0 cm  Final Defect Size: 5 cm x 2 cm  Number of Mohs stages: 1  Level of Defect: Fat  Local anesthetic: 6 mL 1% lidocaine with epinephrine  Repair Type: Intermediate repear  Repair Size: 6 cm  Suture Material: 4-0 Monocryl; 5-0 fast absorbing gut    Procedure:    Stage I  We discussed the principles of treatment and most likely complications including scarring, bleeding, infection, swelling, pain, crusting, nerve damage, large wound,  incomplete excision, wound dehiscence,  nerve damage, recurrence, and a second procedure may be recommended to obtain the best cosmetic or functional result.    Informed consent was obtained and the patient underwent the procedure as follows:  The patient was placed supine on the operating table.  The cancer was identified, outlined with a marker, and verified by the patient.  The entire surgical field was prepped with chlorhexidine.  The surgical site was anesthetized using lidocaine with epinephrine.    The area of clinically apparent tumor was debulked. The layer of tissue was then surgically excised using a #15 blade and was then transferred onto a specimen sheet maintaining the orientation of the specimen. Hemostasis was obtained using electrocoagulation. The wound site was then  covered with a dressing while the tissue samples were processed for examination.    The excised tissue was transported to the Mohs histology laboratory maintaining the tissue orientation.  The tissue specimen was relaxed so that the entire surgical margin was in a a single horizontal plane for sectioning and inked for precise mapping.  A precise reference map was drawn to reflect the sectioning of the specimen, colored inking of the margins, and orientation on the patient.  The tissue was processed using horizontal sectioning of the base and continuous peripheral margins.  The histopathologic sections were reviewed in conjunction with the reference map.    Total blocks: 2  Total slides: 7    There were no cancer cells visualized on examination, therefore Mohs surgery was complete.    REPAIR: An intermediate layered linear closure was selected as the procedure which would maximally preserve both function and cosmesis.    After the excision of the tumor, the area was carefully undermined. Hemostasis was obtained with electrocoagulation.  Closure was oriented so that the wound was parallel to the patient's relaxed skin tension lines.   The subcutaneous and dermal layers were then closed with 4-0 Monocryl sutures. The epidermis was then carefully approximated along the length of the wound using 5-0 fast absorbing gut simple running sutures.     Estimated blood loss was less than 10 ml for all surgical sites. A sterile pressure dressing was applied and wound care instructions, with a written handout, were given. The patient was discharged from the [facility] alert and ambulatory.    Follow-up for suture removal: Not applicable as only dissolving sutures used    Clifford Hernandez MD was immediately available for the entire surgery and was physicially present for the key portions of the procedure.      Scribe Disclosure:  ZO PÉREZ, am serving as a scribe to document services personally performed by Clifford  MD Mary based on data collection and the provider's statements to me.     Attending attestation:  I personally performed the entire procedure.  I have reviewed the note and edited it as necessary, and agree with its contents.    Clifford Hernandez M.D.  Professor  Director of Dermatologic Surgery  Department of Dermatology  [facility]    Dermatology Surgery Clinic  [facility] Surgery [place] [phone]

## 2023-03-23 NOTE — PATIENT INSTRUCTIONS
Wound care    I will experience scar, bleeding, swelling, pain, crusting and redness. I may experience incomplete removal or recurrence. Risks are bleeding, bruising, swelling, infection, nerve damage, & a large wound. A second procedure may be recommended to obtain the best cosmetic or functional result.       A three month office visit with your Surgeon is recommended for scar evaluation. Please reach out sooner if you have concerns about you surgical site/wound.    Caring for your skin after surgery    After your surgery, a pressure bandage will be placed over the area that has stitches. This is important to prevent bleeding. Please follow these instructions over the next 1 to 2 weeks. Following this regimen will help to prevent complications as your wound heals.     For the first 48 hours after your surgery:    Leave the pressure dressing on and keep it dry. If it should come loose, you may re-tape it, but do not take it off.  Relax and take it easy. Do not do any vigorous exercise or heavy lifting. This could cause the wound to bleed.  Post-Operative pain is usually mild. If you are able to take tylenol, You may take plain or extra-strength Tylenol (acetaminophen) As directed on the bottle (do not take more than 4,000mg in one day). If you are able to take ibuprofen, you can alternate the tylenol and ibuprofen.   Avoid alcohol as this may increase your tendency to bleed.   You may put an ice pack around the bandaged area for 20 minutes at a time as needed. This may help reduce swelling, bruising, and pain. Make sure the ice pack is waterproof so that the pressure bandage doesn t get wet.  If the wound is on the face try to sleep with your head elevated. Either in a recliner or propped up in bed, this will decrease swelling around the eyes.   You may see a small amount of drainage or blood on your pressure bandage. This is normal. However:  If drainage or bleeding continues or saturates the bandage, you will  "need to apply firm pressure over the bandage with a piece of gauze for 15 minutes.  If bleeding continues after applying pressure for 15 minutes, apply an ice pack to the bandaged area for 15 minutes.  If bleeding still continues, call our office or go to the nearest emergency room.    Remove pressure dressing 48 hours after surgery:    Carefully remove the pressure bandage. If it seems sticky or too difficult to get off, you may need to soak it off in the shower.  After the pressure dressing is removed, you may shower and get the wound wet. However, Do Not let the forceful stream of the shower hit the wound directly.  Follow these wound care and dressing change instructions:    You have steri strips, skin glue (purplish/clear), and tegaderm (clear film) over your incision line, you can shower.   You may allow water to run over the site. Do not soak.  Do Not rub or scrub the site.  Pat dry after the shower or bath.  Avoid topical medications, lotions, creams, ointment,or oils.  Do not use tanning lamps or expose the site to sun.   Check wound appearance daily, some swelling and redness is normal after a procedure but should go away as your would is healing. If the swelling and redness or pain increases or if any other signs of infection occur listed below please send in a photo via my chart and or call us to let us know.  The clear film should start peeling off approximately around 2 weeks. By this time your wound should be sufficiently healed. If it still looks to be healing when the glue comes off you can clean the wound with soapy warm water daily in the shower and apply Vaseline to the incision line.   Once the clear film starts peeling off to the point where water is getting trapped under the clear film, please gently peel off.        If you are able to take acetaminophen (\"Tylenol,\" etc.) and ibuprofen (\"Advil\" or \"Motrin,\" etc.), then you may STAGGER these medications by taking 400 mg of ibuprofen (usually " two tabs) every 8 hours and 1,000 mg of acetaminophen (e.g., two tabs of extra-strength Tylenol) every 8 hours.    This means, for example, that you could take the followin,000 mg of Tylenol, followed 4 hours later by 400 mg Ibuprofen, followed 4 hours later with 1,000 mg of Tylenol, followed 4 hours later by 400 mg Ibuprofen, followed 4 hours later with 1,000 mg of Tylenol, and so forth.     Essentially, you can take either 1,000 mg of Tylenol or 400 mg of ibuprofen in alternating fashion EVERY FOUR HOURS.    Do NOT exceed more than 4,000 mg of Tylenol or 3,200 mg of ibuprofen per 24 hours. If you are not able to take Tylenol or ibuprofen as above due to other health issues (or a physician has told you directly that you are not allowed to take one of them, say due to pre-existing severe liver or kidney issues), then disregard the above directions.    Scientific evidence supports that this combination/schedule of pain medications is just as effective, if not more effective, than taking a narcotic pain medicine.       Follow up will be a 3 month scar evaluation either in person or via a telephone visit with you sending in a photo via [employer]. Unless you have been told to follow up sooner or if you have concerns and would like to be see sooner. Please call or send us in a [employer] message if possible and attach a photo.        What to expect:    The first couple of days your wound may be tender and may bleed slightly when doing wound care.  There may be swelling and bruising around the wound, especially if it is near the eyes. For your comfort, you may apply ice or cold compresses to the bruises after your have removed the pressure bandage.  The area around your wound may be numb for several weeks or even months.  You may experience periodic sharp pain or mild itching around the wound as it heals.   The suture line will look dark for a while but will lighten over time.       When to call us:    You have bleeding  that will not stop after applying pressure and ice.  You have pain that is not controlled with Tylenol (acetaminophen.)  You have signs or symptoms of an infection such as:  Fever over 100 degrees Fahrenheit  Redness, warmth or foul-smelling drainage from the wound  If you have any questions, or are not sure how to take care of the wound.    Phone numbers:    During business hours (M-F 8:00-4:30 p.m.)  [facility]-  [phone] Option 1 appt. desk  [phone]  Option 3 nurse triage line  ---------------------------------------------------------  Evenings/Weekends/Holidays  Hospital - [phone]   TTY for hearing abuyalxv-[phone]  *Ask  to page dermatologist on-call  Emergency Jtcr-[phone]  TTY for hearing impaired- [phone]

## 2023-03-23 NOTE — NURSING NOTE
Chief Complaint   Patient presents with     Derm Problem     L groin BCC   \  Jailyn URIOSTEGUI RN-BSN  Dermatology Surgery  [phone]

## 2023-03-24 ENCOUNTER — TELEPHONE (OUTPATIENT)
Dept: DERMATOLOGY | Facility: CLINIC | Age: 63
End: 2023-03-24
Payer: COMMERCIAL

## 2023-03-24 NOTE — CONFIDENTIAL NOTE
Follow up call completed following Mohs procedure with Dr. Hernandez       Are you having pain? no  Are you taking pain medication? Tylenol, ibuprofen rotating  Are you applying ice?  no  Have you had any noticeable bleeding through the bandage? no   Do you have any other concerns? no         Please call [phone] option 3 if you have any questions or concerns.

## 2023-03-27 NOTE — PROGRESS NOTES
[facility] Mohs Surgery Procedure Note    Case #: 1  Date of Service:  Mar 23, 2023  Surgery: Mohs micrographic surgery (MMS)  Staff surgeon: Clifford Hernandez MD  Fellow surgeon: None  Resident surgeon: None  Nurse: Shira Cooper CMA    Tumor Type: BCC  Location: left groin  Derm-Path Accession #: [id number]    Mohs Accession #:   Pre-Op Size: 4.0 cm x 3.0 cm  Final Defect Size: 5 cm x 2 cm  Number of Mohs stages: 1  Level of Defect: Fat  Local anesthetic: 6 mL 1% lidocaine with epinephrine  Repair Type: Intermediate repear  Repair Size: 6 cm  Suture Material: 4-0 Monocryl; 5-0 fast absorbing gut    Procedure:    Stage I  We discussed the principles of treatment and most likely complications including scarring, bleeding, infection, swelling, pain, crusting, nerve damage, large wound,  incomplete excision, wound dehiscence,  nerve damage, recurrence, and a second procedure may be recommended to obtain the best cosmetic or functional result.    Informed consent was obtained and the patient underwent the procedure as follows:  The patient was placed supine on the operating table.  The cancer was identified, outlined with a marker, and verified by the patient.  The entire surgical field was prepped with chlorhexidine.  The surgical site was anesthetized using lidocaine with epinephrine.    The area of clinically apparent tumor was debulked. The layer of tissue was then surgically excised using a #15 blade and was then transferred onto a specimen sheet maintaining the orientation of the specimen. Hemostasis was obtained using electrocoagulation. The wound site was then covered with a dressing while the tissue samples were processed for examination.    The excised tissue was transported to the Mohs histology laboratory maintaining the tissue orientation.  The tissue specimen was relaxed so that the entire surgical margin was in a a single horizontal plane for sectioning and inked for precise mapping.   A precise reference map was drawn to reflect the sectioning of the specimen, colored inking of the margins, and orientation on the patient.  The tissue was processed using horizontal sectioning of the base and continuous peripheral margins.  The histopathologic sections were reviewed in conjunction with the reference map.    Total blocks: 2  Total slides: 7    There were no cancer cells visualized on examination, therefore Mohs surgery was complete.    REPAIR: An intermediate layered linear closure was selected as the procedure which would maximally preserve both function and cosmesis.    After the excision of the tumor, the area was carefully undermined. Hemostasis was obtained with electrocoagulation.  Closure was oriented so that the wound was parallel to the patient's relaxed skin tension lines.   The subcutaneous and dermal layers were then closed with 4-0 Monocryl sutures. The epidermis was then carefully approximated along the length of the wound using 5-0 fast absorbing gut simple running sutures.     Estimated blood loss was less than 10 ml for all surgical sites. A sterile pressure dressing was applied and wound care instructions, with a written handout, were given. The patient was discharged from the [facility] Center alert and ambulatory.    Follow-up for suture removal: Not applicable as only dissolving sutures used    Clifford Hernandez MD was immediately available for the entire surgery and was physicially present for the key portions of the procedure.      Scribe Disclosure:  I, ZO WHITE, am serving as a scribe to document services personally performed by Clifford Hernandez MD based on data collection and the provider's statements to me.     Attending attestation:  I personally performed the entire procedure.  I have reviewed the note and edited it as necessary, and agree with its contents.    Clifford Hernandez M.D.  Professor  Director of Dermatologic Surgery  Department of Dermatology  [facility]  [place]    Dermatology Surgery Clinic  [facility]  [address] [place] [address]

## 2023-03-28 ENCOUNTER — HOSPITAL ENCOUNTER (EMERGENCY)
Facility: CLINIC | Age: 63
Discharge: HOME | End: 2023-03-28
Attending: PHYSICIAN ASSISTANT | Admitting: PHYSICIAN ASSISTANT
Payer: COMMERCIAL

## 2023-03-28 ENCOUNTER — TELEPHONE (OUTPATIENT)
Dept: DERMATOLOGY | Facility: CLINIC | Age: 63
End: 2023-03-28
Payer: COMMERCIAL

## 2023-03-28 VITALS
RESPIRATION RATE: 20 BRPM | DIASTOLIC BLOOD PRESSURE: 56 MMHG | TEMPERATURE: 97.8 F | OXYGEN SATURATION: 96 % | HEART RATE: 61 BPM | SYSTOLIC BLOOD PRESSURE: 137 MMHG

## 2023-03-28 DIAGNOSIS — T81.31XA POSTOPERATIVE WOUND DEHISCENCE, INITIAL ENCOUNTER: ICD-10-CM

## 2023-03-28 PROCEDURE — 99203 OFFICE O/P NEW LOW 30 MIN: CPT | Performed by: PHYSICIAN ASSISTANT

## 2023-03-28 PROCEDURE — G0463 HOSPITAL OUTPT CLINIC VISIT: HCPCS | Performed by: PHYSICIAN ASSISTANT

## 2023-03-28 RX ORDER — CEPHALEXIN 500 MG/1
500 CAPSULE ORAL 4 TIMES DAILY
Qty: 28 CAPSULE | Refills: 0 | Status: SHIPPED | OUTPATIENT
Start: 2023-03-28 | End: 2023-04-04

## 2023-03-28 ASSESSMENT — ACTIVITIES OF DAILY LIVING (ADL): ADLS_ACUITY_SCORE: 35

## 2023-03-28 NOTE — TELEPHONE ENCOUNTER
[facility] Call Center    Phone Message    May a detailed message be left on voicemail: yes     Reason for Call: Symptoms or Concerns     If patient has red-flag symptoms, warm transfer to triage line    Current symptom or concern: stiches came out, has a hole and bleeding.    Symptoms have been present for: 1 hour(s)    Has patient previously been seen for this? No      Are there any new or worsening symptoms? No      Action Taken: Message routed to:  [facility] ([facility]): DERM    Travel Screening: Not Applicable

## 2023-03-28 NOTE — ED TRIAGE NOTES
Pt reports that he felt a pop while getting in his truck and then felt blood flow down his leg.     PT states that he is feeling dizzy and light headed.       
normal...

## 2023-03-28 NOTE — ED PROVIDER NOTES
History     Chief Complaint   Patient presents with     Post-op Problem     HPI  Fish Abad is a 62 year old male who presents to urgent care with concern over wound.  Patient reports that he had a Mohs procedure on the left inguinal fold performed 5 days ago on 3/23/23.  He reports chronic decreased mobility, fall risk due to history of back pain.  He was attempting to get into his truck earlier today and as he bent his leg to step up, he felt a popping sensation at the site of the wound and had significant amount of blood leaking down his leg.  He applied direct pressure to the area for 20 to 30 minutes and states that it had soaked through his gauze pad, however bleeding is controlled at this time. Since arrival in the department he has complained of some lightheadedness.  He denies any fever, cough, dyspnea, wheezing, nausea, vomiting, diarrhea, significant abdomina pain.  He is not on any blood thinners.      Allergies:  Allergies   Allergen Reactions     Canagliflozin Other (See Comments)     Groin wound/gangrene     Food GI Disturbance     Lactose      Penicillins Rash     Seasonal Allergies Other (See Comments)     Problem List:    Patient Active Problem List    Diagnosis Date Noted     Acute post-operative pain 11/11/2022     Priority: Medium     BCC (basal cell carcinoma), leg, left 10/19/2022     Priority: Medium     Added automatically from request for surgery [id number]       Non-healing left groin open wound 08/03/2022     Priority: Medium     Left renal stone 05/24/2022     Priority: Medium     Formatting of this note might be different from the original.  Added automatically from request for surgery [id number]       Acute bilateral low back pain with right-sided sciatica 01/07/2022     Priority: Medium     Decreased activity tolerance 01/07/2022     Priority: Medium     Weakness of trunk musculature 01/07/2022     Priority: Medium     Allergic rhinitis 07/24/2017     Priority: Medium     CPAP  (continuous positive airway pressure) dependence 2017     Priority: Medium     Formatting of this note might be different from the original.  autoCPAP 7 to 15 cmH2O       Hx of tonsillectomy 2017     Priority: Medium     FRANKY (obstructive sleep apnea) 2017     Priority: Medium      Past Medical History:    Past Medical History:   Diagnosis Date     BCC (basal cell carcinoma), leg, left      Benign essential hypertension      Chronic back pain      Depression      Diabetes mellitus (H)      Gastroesophageal reflux disease without esophagitis      Mixed hyperlipidemia      Seasonal allergic rhinitis      Past Surgical History:    Past Surgical History:   Procedure Laterality Date     LITHOTRIPSY  2022     MOHS MICROGRAPHIC PROCEDURE Left 2022    Procedure: MOHS MICROGRAPHIC SURGERY left groin with flap closure;  Surgeon: Clifford Hernandez MD;  Location: [facility] OR     REPAIR MOHS Left 2022    Procedure: CLOSURE, MOHS PROCEDURE DEFECT ABDOMEN;  Surgeon: Clifford Hernandez MD;  Location: [facility] OR     VASECTOMY       Family History:    Family History   Problem Relation Age of Onset     Diabetes Mother      Prostate Cancer Father      Anesthesia Reaction No family hx of      Thrombosis No family hx of      Social History:  Marital Status:   [5]  Social History     Tobacco Use     Smoking status: Former     Packs/day: 0.10     Years: 12.00     Pack years: 1.20     Types: Cigarettes     Quit date: 2022     Years since quittin.6     Smokeless tobacco: Never   Substance Use Topics     Alcohol use: Yes     Alcohol/week: 5.0 standard drinks     Types: 5 Cans of beer per week     Comment: Occasionally     Drug use: Never      Medications:    acetaminophen (TYLENOL) 325 MG tablet  aspirin (ASA) 81 MG chewable tablet  atorvastatin (LIPITOR) 10 MG tablet  atorvastatin (LIPITOR) 20 MG tablet  Beauty Noted (MELINA-C PO)  Cholecalciferol (VITAMIN D3) 50 MCG (2000) CHEW  cyanocobalamin (VITAMIN  B-12) 1000 MCG tablet  escitalopram (LEXAPRO) 5 MG tablet  fluticasone (FLONASE) 50 MCG/ACT nasal spray  gabapentin (NEURONTIN) 100 MG capsule  gabapentin (NEURONTIN) 300 MG capsule  hydrocortisone (CORTAID) 1 % external cream  HYDROmorphone (DILAUDID) 2 MG tablet  ibuprofen (ADVIL/MOTRIN) 600 MG tablet  losartan (COZAAR) 25 MG tablet  magnesium oxide 400 MG CAPS  metFORMIN (GLUCOPHAGE XR) 500 MG 24 hr tablet  methocarbamol (ROBAXIN) 500 MG tablet  methocarbamol (ROBAXIN) 750 MG tablet  naproxen (NAPROSYN) 250 MG tablet  omeprazole (PRILOSEC) 20 MG DR capsule  ondansetron (ZOFRAN ODT) 4 MG ODT tab  polyethylene glycol (MIRALAX) 17 GM/Dose powder  propranolol (INDERAL) 20 MG tablet  senna-docusate (SENOKOT-S/PERICOLACE) 8.6-50 MG tablet  traZODone (DESYREL) 100 MG tablet      Review of Systems   Constitutional: Negative for chills and fever.   Respiratory: Negative for cough, shortness of breath and wheezing.    Cardiovascular: Negative for chest pain and palpitations.   Gastrointestinal: Negative for abdominal pain, diarrhea, nausea and vomiting.   Genitourinary: Negative for dysuria, frequency, hematuria and urgency.   Skin: Positive for wound. Negative for rash.   Neurological: Positive for light-headedness. Negative for weakness, numbness and headaches.     Physical Exam   BP: 137/56  Pulse: 61  Temp: 97.8  F (36.6  C)  Resp: 20  SpO2: 96 %  Physical Exam  HENT:      Head: Normocephalic and atraumatic.   Cardiovascular:      Rate and Rhythm: Normal rate and regular rhythm.      Heart sounds: No murmur heard.    No friction rub. No gallop.   Pulmonary:      Effort: Pulmonary effort is normal.      Breath sounds: Normal breath sounds.   Abdominal:      General: There is no distension.      Palpations: Abdomen is soft.      Tenderness: There is no abdominal tenderness. There is no guarding.      Comments: Well healed surgical scare to the anterior abdomen.  There is minimal tenderness to palpation, adjacent to  surgical wound in the left lower pelvic region    Skin:     Comments: Patient does have evidence of wound dehiscence of his surgical incision site.  4 cm x 1 cm in the left inguinal fold are open.  There is some overlying dried glue on the lateral portion of the wound.  No active bleeding .  No purulent discharge.  No surrounding erythema. Area is tender to palpation bilaterally    Neurological:      Mental Status: He is alert.      Sensory: No sensory deficit.               ED Course           Procedures       Critical Care time:  none        No results found for this or any previous visit (from the past 24 hour(s)).  Medications - No data to display    Assessments & Plan (with Medical Decision Making)     I have reviewed the nursing notes.  I have reviewed the findings, diagnosis, plan and need for follow up with the patient.     New Prescriptions    CEPHALEXIN (KEFLEX) 500 MG CAPSULE    Take 1 capsule (500 mg) by mouth 4 times daily for 7 days     Final diagnoses:   Postoperative wound dehiscence, initial encounter     60-year-old male with recent Mohs procedure 5 days prior to arrival presents to the urgent care with concern over opening of wound as he attempted to get into vehicle earlier today.  Physical exam findings were significant for wound dehiscence of his left inguinal fold approximately 4 cm in length by 1 cm wide with tenderness palpation of wound margins.  No significant erythema, warmth, swelling or evidence of purulent discharge.  I have low suspicion for infection at this time however did agree to initiate antibiotic given location, patient's past medical history and mechanism of injury.  We discussed with patient duration some placement of sutures cannot really suture at this time wound will be required to heal by secondary intent.  Contact dermatology to discuss further management.  Worrisome reasons to return to ER/UC discussed.     Disclaimer: This note consists of symbols derived from  keyboarding, dictation, and/or voice recognition software. As a result, there may be errors in the script that have gone undetected.  Please consider this when interpreting information found in the chart.      3/28/2023   [facility] EMERGENCY DEPT     Caroline Dunlap PA-C  03/30/23 150

## 2023-03-30 ASSESSMENT — ENCOUNTER SYMPTOMS
NAUSEA: 0
HEADACHES: 0
FREQUENCY: 0
FEVER: 0
WEAKNESS: 0
DIARRHEA: 0
VOMITING: 0
ABDOMINAL PAIN: 0
COUGH: 0
WHEEZING: 0
CHILLS: 0
LIGHT-HEADEDNESS: 1
NUMBNESS: 0
WOUND: 1
SHORTNESS OF BREATH: 0
HEMATURIA: 0
DYSURIA: 0
PALPITATIONS: 0

## 2023-04-02 ENCOUNTER — HEALTH MAINTENANCE LETTER (OUTPATIENT)
Age: 63
End: 2023-04-02

## 2023-09-02 ENCOUNTER — HEALTH MAINTENANCE LETTER (OUTPATIENT)
Age: 63
End: 2023-09-02

## 2023-11-11 ENCOUNTER — HEALTH MAINTENANCE LETTER (OUTPATIENT)
Age: 63
End: 2023-11-11

## 2024-01-20 ENCOUNTER — HEALTH MAINTENANCE LETTER (OUTPATIENT)
Age: 64
End: 2024-01-20

## 2024-06-08 ENCOUNTER — HEALTH MAINTENANCE LETTER (OUTPATIENT)
Age: 64
End: 2024-06-08

## 2024-10-26 ENCOUNTER — HEALTH MAINTENANCE LETTER (OUTPATIENT)
Age: 64
End: 2024-10-26

## 2024-12-28 ENCOUNTER — HEALTH MAINTENANCE LETTER (OUTPATIENT)
Age: 64
End: 2024-12-28

## 2025-02-23 ENCOUNTER — HEALTH MAINTENANCE LETTER (OUTPATIENT)
Age: 65
End: 2025-02-23

## 2025-05-25 ENCOUNTER — HEALTH MAINTENANCE LETTER (OUTPATIENT)
Age: 65
End: 2025-05-25

## (undated) DEVICE — LABEL MEDICATION SYSTEM 3303-P

## (undated) DEVICE — DRSG GAUZE 4X4" TRAY 6939

## (undated) DEVICE — SUCTION MANIFOLD NEPTUNE 2 SYS 1 PORT 702-025-000

## (undated) DEVICE — SYR BULB IRRIG 50ML LATEX FREE 0035280

## (undated) DEVICE — PACK ENT MINOR CUSTOM ASC

## (undated) DEVICE — ESU NDL COLORADO MICRO 3CM STR N103A

## (undated) DEVICE — GLOVE PROTEXIS MICRO 7.0  2D73PM70

## (undated) DEVICE — DRAPE SHEET MED 44X70" 9355

## (undated) DEVICE — NDL 27GA 1.25" 305136

## (undated) DEVICE — BLADE KNIFE SURG 15 371115

## (undated) DEVICE — LINEN TOWEL PACK X5 5464

## (undated) DEVICE — SU VICRYL 3-0 PS-2 18" UND J497G

## (undated) DEVICE — EYE PREP BETADINE 5% SOLUTION 30ML 0065-0411-30

## (undated) DEVICE — ESU GROUND PAD ADULT W/CORD E7507

## (undated) DEVICE — DRSG STERI STRIP 1/4X4" R1546

## (undated) DEVICE — DRSG TELFA 3X8" 1238

## (undated) DEVICE — SOL NACL 0.9% IRRIG 500ML BOTTLE 2F7123

## (undated) DEVICE — SPONGE LAP 12X12" X8425

## (undated) DEVICE — PREP SKIN SCRUB TRAY 4461A

## (undated) DEVICE — SOL WATER IRRIG 500ML BOTTLE 2F7113

## (undated) RX ORDER — LIDOCAINE HYDROCHLORIDE AND EPINEPHRINE 10; 10 MG/ML; UG/ML
INJECTION, SOLUTION INFILTRATION; PERINEURAL
Status: DISPENSED
Start: 2022-11-11

## (undated) RX ORDER — PROPOFOL 10 MG/ML
INJECTION, EMULSION INTRAVENOUS
Status: DISPENSED
Start: 2022-11-11

## (undated) RX ORDER — BUPIVACAINE HYDROCHLORIDE 2.5 MG/ML
INJECTION, SOLUTION INFILTRATION; PERINEURAL
Status: DISPENSED
Start: 2022-11-11

## (undated) RX ORDER — ONDANSETRON 2 MG/ML
INJECTION INTRAMUSCULAR; INTRAVENOUS
Status: DISPENSED
Start: 2022-11-11

## (undated) RX ORDER — FENTANYL CITRATE 50 UG/ML
INJECTION, SOLUTION INTRAMUSCULAR; INTRAVENOUS
Status: DISPENSED
Start: 2022-11-11

## (undated) RX ORDER — HYDROMORPHONE HYDROCHLORIDE 1 MG/ML
INJECTION, SOLUTION INTRAMUSCULAR; INTRAVENOUS; SUBCUTANEOUS
Status: DISPENSED
Start: 2022-11-11

## (undated) RX ORDER — OXYCODONE HYDROCHLORIDE 5 MG/1
TABLET ORAL
Status: DISPENSED
Start: 2022-11-11

## (undated) RX ORDER — LIDOCAINE HYDROCHLORIDE 20 MG/ML
INJECTION, SOLUTION EPIDURAL; INFILTRATION; INTRACAUDAL; PERINEURAL
Status: DISPENSED
Start: 2022-11-11